# Patient Record
Sex: MALE | Race: WHITE | NOT HISPANIC OR LATINO | Employment: FULL TIME | URBAN - METROPOLITAN AREA
[De-identification: names, ages, dates, MRNs, and addresses within clinical notes are randomized per-mention and may not be internally consistent; named-entity substitution may affect disease eponyms.]

---

## 2020-02-18 ENCOUNTER — OFFICE VISIT (OUTPATIENT)
Dept: URGENT CARE | Age: 60
End: 2020-02-18
Payer: COMMERCIAL

## 2020-02-18 VITALS
TEMPERATURE: 98.2 F | SYSTOLIC BLOOD PRESSURE: 136 MMHG | RESPIRATION RATE: 20 BRPM | HEART RATE: 80 BPM | OXYGEN SATURATION: 98 % | DIASTOLIC BLOOD PRESSURE: 86 MMHG

## 2020-02-18 DIAGNOSIS — J06.9 UPPER RESPIRATORY INFECTION WITH COUGH AND CONGESTION: Primary | ICD-10-CM

## 2020-02-18 PROCEDURE — 99213 OFFICE O/P EST LOW 20 MIN: CPT | Performed by: NURSE PRACTITIONER

## 2020-02-18 RX ORDER — BENZONATATE 200 MG/1
200 CAPSULE ORAL 3 TIMES DAILY PRN
Qty: 20 CAPSULE | Refills: 0 | Status: SHIPPED | OUTPATIENT
Start: 2020-02-18 | End: 2020-08-17 | Stop reason: ALTCHOICE

## 2020-02-18 RX ORDER — PREDNISONE 20 MG/1
20 TABLET ORAL 2 TIMES DAILY WITH MEALS
Qty: 6 TABLET | Refills: 0 | Status: SHIPPED | OUTPATIENT
Start: 2020-02-18 | End: 2020-02-21

## 2020-02-18 NOTE — PROGRESS NOTES
[facility]        NAME: Keanu Watson is a 61 y o  male  : 1960    MRN: [id number]  DATE: 2020  TIME: 3:16 PM    Assessment and Plan   Upper respiratory infection with cough and congestion [J06 9]  1  Upper respiratory infection with cough and congestion  benzonatate (TESSALON) 200 MG capsule    predniSONE 20 mg tablet         Patient Instructions     Wash hands frequently, covers coughts  Take meds as directed  Likely viral infection  Follow up with PCP in 3-5 days  Proceed to  ER if symptoms worsen  Chief Complaint     Chief Complaint   Patient presents with    Cold Like Symptoms     x 3 days, c/o cough, wheezing, and chest congestion, with use of mucinex prn          History of Present Illness       HPI   C/o cough and cold with some throat discomfort x 3 days  Review of Systems   Review of Systems   Constitutional: Negative for chills and fever  HENT: Positive for congestion, rhinorrhea and sore throat  Respiratory: Positive for cough and wheezing  Negative for chest tightness and shortness of breath  Gastrointestinal: Negative for diarrhea, nausea and vomiting  Neurological: Negative for dizziness and headaches  Current Medications       Current Outpatient Medications:     benzonatate (TESSALON) 200 MG capsule, Take 1 capsule (200 mg total) by mouth 3 (three) times a day as needed for cough, Disp: 20 capsule, Rfl: 0    Naproxen Sodium (ALEVE PO), Take 1 tablet by mouth every 12 (twelve) hours as needed  , Disp: , Rfl:     predniSONE 20 mg tablet, Take 1 tablet (20 mg total) by mouth 2 (two) times a day with meals for 3 days, Disp: 6 tablet, Rfl: 0    Current Allergies     Allergies as of 2020    (No Known Allergies)            The following portions of the patient's history were reviewed and updated as appropriate: allergies, current medications, past family history, past medical history, past social history, past surgical history and problem list      Past Medical History:   Diagnosis Date    Melanoma [facility])        History reviewed  No pertinent surgical history  No family history on file  Medications have been verified  Objective   /86   Pulse 80   Temp 98 2 °F (36 8 °C) (Temporal)   Resp 20   SpO2 98%        Physical Exam     Physical Exam   Constitutional: He appears well-developed  No distress  HENT:   Right Ear: External ear normal    Left Ear: External ear normal    Cardiovascular: Normal rate and regular rhythm  Pulmonary/Chest: Effort normal and breath sounds normal    Lymphadenopathy:     He has no cervical adenopathy

## 2020-02-18 NOTE — PROGRESS NOTES
[facility]        NAME: Duane Armenta is a 61 y o  male  : 1960    MRN: [id number]  DATE: 2020  TIME: 3:18 PM    Assessment and Plan   Upper respiratory infection with cough and congestion [J06 9]  1  Upper respiratory infection with cough and congestion  benzonatate (TESSALON) 200 MG capsule    predniSONE 20 mg tablet         Patient Instructions     Take meds as directed  Wash hands frequently, covers coughs,   Adequate hydration and nutrition  Follow up with PCP in 3-5 days  Proceed to  ER if symptoms worsen  Chief Complaint     Chief Complaint   Patient presents with    Cold Like Symptoms     x 3 days, c/o cough, wheezing, and chest congestion, with use of mucinex prn          History of Present Illness       HPI       Review of Systems   Review of Systems      Current Medications       Current Outpatient Medications:     benzonatate (TESSALON) 200 MG capsule, Take 1 capsule (200 mg total) by mouth 3 (three) times a day as needed for cough, Disp: 20 capsule, Rfl: 0    Naproxen Sodium (ALEVE PO), Take 1 tablet by mouth every 12 (twelve) hours as needed  , Disp: , Rfl:     predniSONE 20 mg tablet, Take 1 tablet (20 mg total) by mouth 2 (two) times a day with meals for 3 days, Disp: 6 tablet, Rfl: 0    Current Allergies     Allergies as of 2020    (No Known Allergies)            The following portions of the patient's history were reviewed and updated as appropriate: allergies, current medications, past family history, past medical history, past social history, past surgical history and problem list      Past Medical History:   Diagnosis Date    Melanoma [facility])        History reviewed  No pertinent surgical history  No family history on file  Medications have been verified          Objective   /86   Pulse 80   Temp 98 2 °F (36 8 °C) (Temporal)   Resp 20   SpO2 98%        Physical Exam     Physical Exam

## 2020-02-18 NOTE — PATIENT INSTRUCTIONS

## 2020-02-27 ENCOUNTER — OFFICE VISIT (OUTPATIENT)
Dept: URGENT CARE | Age: 60
End: 2020-02-27
Payer: COMMERCIAL

## 2020-02-27 VITALS
DIASTOLIC BLOOD PRESSURE: 87 MMHG | OXYGEN SATURATION: 98 % | HEART RATE: 71 BPM | RESPIRATION RATE: 18 BRPM | TEMPERATURE: 98 F | SYSTOLIC BLOOD PRESSURE: 134 MMHG

## 2020-02-27 DIAGNOSIS — J01.40 ACUTE PANSINUSITIS, RECURRENCE NOT SPECIFIED: Primary | ICD-10-CM

## 2020-02-27 DIAGNOSIS — H66.92 ACUTE OTITIS MEDIA, LEFT: ICD-10-CM

## 2020-02-27 PROCEDURE — 99213 OFFICE O/P EST LOW 20 MIN: CPT | Performed by: PHYSICIAN ASSISTANT

## 2020-02-27 RX ORDER — AMOXICILLIN AND CLAVULANATE POTASSIUM 875; 125 MG/1; MG/1
1 TABLET, FILM COATED ORAL EVERY 12 HOURS SCHEDULED
Qty: 14 TABLET | Refills: 0 | Status: SHIPPED | OUTPATIENT
Start: 2020-02-27 | End: 2020-03-05

## 2020-02-27 NOTE — PROGRESS NOTES
[facility] [facility]        NAME: Narciso Ruffin is a 61 y o  male  : 1960    MRN: [id number]  DATE: 2020  TIME: 4:04 PM    Assessment and Plan   Acute pansinusitis, recurrence not specified [J01 40]  1  Acute pansinusitis, recurrence not specified  amoxicillin-clavulanate (AUGMENTIN) 875-125 mg per tablet   2  Acute otitis media, left  amoxicillin-clavulanate (AUGMENTIN) 875-125 mg per tablet         Patient Instructions     Take antibiotics as prescribed  Use Flonase or nasal saline spray for symptomatic treatment  Warm water gargles  Change toothbrush in 2-3 days  Stay hydrated with lots of water/fluids  Follow-up with PCP in the next 1-2 days for reexamination and to ensure resolution of symptoms  Go to the ED if any fevers, unable to stay hydrated, abdominal pain, chest pain, shortness of breath, change in voice, pain or difficulty swallowing, new or worsening symptoms or other concerning symptoms  Chief Complaint     Chief Complaint   Patient presents with    Cold Like Symptoms     seen  here URI, c/o cough, post nasal drip and sinus pressure/headaches, c/o possible sinus infection          History of Present Illness       60 y/o male presents with "sinusitis" x 1 week  States he was seen last week more for a cough and sore throat and told it was "viral" at the time- got tessalon perles and prednisone  Took those and said the cough got better and has now resolved  Then started with the nasal congestion, sinus pressure, post nasal drip, mild sinus headache  Not the worse headache ever had, feels like previous sinus infections  Does note some intermittent ear pain  Has not tried anything else over-the-counter  Denies any fevers  Denies any chest pain, chest tightness, wheezing, shortness of breath, GI/ symptoms other complaints  Denies any known sick contacts  No recent travel exposure to the corona virus  Denies any past medical history        Review of Systems Review of Systems   Constitutional: Negative for activity change, appetite change, chills, fatigue and fever  HENT: Positive for congestion, ear pain, postnasal drip and rhinorrhea  Negative for ear discharge, facial swelling, sinus pressure, sore throat, trouble swallowing and voice change  Eyes: Negative for itching and visual disturbance  Respiratory: Negative for cough, chest tightness, shortness of breath and wheezing  Cardiovascular: Negative for chest pain and leg swelling  Gastrointestinal: Negative for abdominal pain, diarrhea, nausea and vomiting  Musculoskeletal: Negative for back pain, joint swelling, neck pain and neck stiffness  Skin: Negative for rash  Neurological: Negative for dizziness, seizures, weakness, numbness and headaches  All other systems reviewed and are negative  Current Medications       Current Outpatient Medications:     amoxicillin-clavulanate (AUGMENTIN) 875-125 mg per tablet, Take 1 tablet by mouth every 12 (twelve) hours for 7 days, Disp: 14 tablet, Rfl: 0    benzonatate (TESSALON) 200 MG capsule, Take 1 capsule (200 mg total) by mouth 3 (three) times a day as needed for cough, Disp: 20 capsule, Rfl: 0    Naproxen Sodium (ALEVE PO), Take 1 tablet by mouth every 12 (twelve) hours as needed  , Disp: , Rfl:     Current Allergies     Allergies as of 02/27/2020    (No Known Allergies)            The following portions of the patient's history were reviewed and updated as appropriate: allergies, current medications, past family history, past medical history, past social history, past surgical history and problem list      Past Medical History:   Diagnosis Date    Melan[facility])        History reviewed  No pertinent surgical history  No family history on file  Medications have been verified          Objective   /87   Pulse 71   Temp 98 °F (36 7 °C)   Resp 18   SpO2 98%        Physical Exam     Physical Exam   Constitutional: He is oriented to person, place, and time  He appears well-developed and well-nourished  No distress  HENT:   Right Ear: Tympanic membrane normal    Left Ear: Tympanic membrane is erythematous and bulging  Mouth/Throat: Uvula is midline and mucous membranes are normal  No uvula swelling  No posterior oropharyngeal edema  Mild PND present with mildly erythematous posterior pharynx  Mild bilateral maxillary and frontal sinus pressure/discomfort to palpation  Airway patent, handling secretions  Eyes: Pupils are equal, round, and reactive to light  Neck: Normal range of motion  Neck supple  Cardiovascular: Normal rate, regular rhythm and normal heart sounds  Pulmonary/Chest: Effort normal and breath sounds normal  No respiratory distress  He has no wheezes  Neurological: He is alert and oriented to person, place, and time  Skin: Capillary refill takes less than 2 seconds  Psychiatric: He has a normal mood and affect  His behavior is normal    Nursing note and vitals reviewed

## 2020-08-17 ENCOUNTER — OFFICE VISIT (OUTPATIENT)
Dept: INTERNAL MEDICINE CLINIC | Facility: CLINIC | Age: 60
End: 2020-08-17
Payer: COMMERCIAL

## 2020-08-17 VITALS
HEART RATE: 60 BPM | DIASTOLIC BLOOD PRESSURE: 76 MMHG | OXYGEN SATURATION: 99 % | HEIGHT: 69 IN | BODY MASS INDEX: 28.58 KG/M2 | TEMPERATURE: 98.1 F | WEIGHT: 193 LBS | SYSTOLIC BLOOD PRESSURE: 132 MMHG

## 2020-08-17 DIAGNOSIS — Z12.5 SCREENING FOR PROSTATE CANCER: ICD-10-CM

## 2020-08-17 DIAGNOSIS — Z12.11 ENCOUNTER FOR SCREENING FOR MALIGNANT NEOPLASM OF COLON: ICD-10-CM

## 2020-08-17 DIAGNOSIS — Z85.820 HISTORY OF MALIGNANT MELANOMA OF SKIN: ICD-10-CM

## 2020-08-17 DIAGNOSIS — Z13.6 ENCOUNTER FOR LIPID SCREENING FOR CARDIOVASCULAR DISEASE: ICD-10-CM

## 2020-08-17 DIAGNOSIS — Z11.59 NEED FOR HEPATITIS C SCREENING TEST: ICD-10-CM

## 2020-08-17 DIAGNOSIS — Z00.00 WELL ADULT ON ROUTINE HEALTH CHECK: ICD-10-CM

## 2020-08-17 DIAGNOSIS — Z13.220 ENCOUNTER FOR LIPID SCREENING FOR CARDIOVASCULAR DISEASE: ICD-10-CM

## 2020-08-17 DIAGNOSIS — Z86.010 HISTORY OF COLON POLYPS: Primary | ICD-10-CM

## 2020-08-17 PROCEDURE — 3008F BODY MASS INDEX DOCD: CPT | Performed by: INTERNAL MEDICINE

## 2020-08-17 PROCEDURE — 1036F TOBACCO NON-USER: CPT | Performed by: INTERNAL MEDICINE

## 2020-08-17 PROCEDURE — 3725F SCREEN DEPRESSION PERFORMED: CPT | Performed by: INTERNAL MEDICINE

## 2020-08-17 PROCEDURE — 99386 PREV VISIT NEW AGE 40-64: CPT | Performed by: INTERNAL MEDICINE

## 2020-08-17 RX ORDER — MELATONIN
1000 EVERY OTHER DAY
COMMUNITY

## 2020-08-17 NOTE — PROGRESS NOTES
Assessment/Plan:    Well adult on routine health check  No acute medical problems  We will obtain some labs for routine screening  History of malignant melanoma of skin  Has annual dermatology follow-up  Encounter for lipid screening for cardiovascular disease  Will obtain lipid profile  Screening for prostate cancer  PSA and free PSA to be done  BMI 28 0-28 9,adult  Lifestyle modification       Diagnoses and all orders for this visit:    History of colon polyps  -     Ambulatory referral to Gastroenterology; Future  -     Hepatitis C antibody; Future  -     CBC and Platelet; Future  -     Comprehensive metabolic panel; Future  -     Lipid panel; Future  -     PSA, total and free; Future    Encounter for screening for malignant neoplasm of colon  -     Ambulatory referral to Gastroenterology; Future  -     Hepatitis C antibody; Future  -     CBC and Platelet; Future  -     Comprehensive metabolic panel; Future  -     Lipid panel; Future  -     PSA, total and free; Future    Need for hepatitis C screening test  -     Ambulatory referral to Gastroenterology; Future  -     Hepatitis C antibody; Future  -     CBC and Platelet; Future  -     Comprehensive metabolic panel; Future  -     Lipid panel; Future  -     PSA, total and free; Future    Well adult on routine health check    History of malignant melanoma of skin    Encounter for lipid screening for cardiovascular disease    Screening for prostate cancer    BMI 28 0-28 9,adult    Other orders  -     cholecalciferol (VITAMIN D3) 1,000 units tablet; Take 1,000 Units by mouth daily          Subjective:      Patient ID: Leeanna Krishna is a 61 y o  male  Patient presents to the office to establish primary medical care  He does not have any ongoing chronic medical issues  He has a history of malignant melanoma removed from his left chest several years ago  He sees a dermatologist annually for surveillance    He is due for follow-up surveillance colonoscopy  We will arrange for get GI consult  He offers no complaints or any change in symptoms  He has not had lab work done for quite some time so we will establish some baseline lab work        Family History   Problem Relation Age of Onset    Breast cancer Mother      Social History     Socioeconomic History    Marital status: /Civil Union     Spouse name: Not on file    Number of children: Not on file    Years of education: Not on file    Highest education level: Not on file   Occupational History    Not on file   Social Needs    Financial resource strain: Not on file    Food insecurity     Worry: Not on file     Inability: Not on file    Transportation needs     Medical: Not on file     Non-medical: Not on file   Tobacco Use    Smoking status: Never Smoker    Smokeless tobacco: Never Used   Substance and Sexual Activity    Alcohol use: Yes     Frequency: 2-3 times a week     Drinks per session: 1 or 2     Binge frequency: Never     Comment: occassionally    Drug use: No    Sexual activity: Yes   Lifestyle    Physical activity     Days per week: Not on file     Minutes per session: Not on file    Stress: Not on file   Relationships    Social connections     Talks on phone: Not on file     Gets together: Not on file     Attends Denominational service: Not on file     Active member of club or organization: Not on file     Attends meetings of clubs or organizations: Not on file     Relationship status: Not on file    Intimate partner violence     Fear of current or ex partner: Not on file     Emotionally abused: Not on file     Physically abused: Not on file     Forced sexual activity: Not on file   Other Topics Concern    Not on file   Social History Narrative    Not on file     Past Medical History:   Diagnosis Date    Melanoma [facility])        Current Outpatient Medications:     cholecalciferol (VITAMIN D3) 1,000 units tablet, Take 1,000 Units by mouth daily, Disp: , Rfl:     Naproxen Sodium (ALEVE PO), Take 1 tablet by mouth as needed , Disp: , Rfl:   No Known Allergies  Past Surgical History:   Procedure Laterality Date    SKIN SURGERY  2004    melanoma [facility]         Review of Systems   Constitutional: Negative  HENT: Negative  Eyes: Negative  Respiratory: Negative  Cardiovascular: Negative  Gastrointestinal: Negative  Genitourinary: Negative  Musculoskeletal: Negative  Skin: Negative  Allergic/Immunologic: Negative  Neurological: Negative  Hematological: Negative  Psychiatric/Behavioral: Negative  Objective:      /76 (BP Location: Left arm, Patient Position: Sitting, Cuff Size: Standard)   Pulse 60   Temp 98 1 °F (36 7 °C) (Temporal)   Ht 5' 9 45" (1 764 m)   Wt 87 5 kg (193 lb)   SpO2 99%   BMI 28 13 kg/m²  BMI Counseling: Body mass index is 28 13 kg/m²  The BMI is above normal  Nutrition recommendations include reducing portion sizes, decreasing overall calorie intake, reducing fast food intake, consuming healthier snacks, moderation in carbohydrate intake, increasing intake of lean protein, reducing intake of saturated fat and trans fat and reducing intake of cholesterol  Physical Exam  Vitals signs reviewed  Constitutional:       General: He is not in acute distress  Appearance: Normal appearance  He is not ill-appearing or diaphoretic  HENT:      Head: Normocephalic and atraumatic  Right Ear: External ear normal       Left Ear: External ear normal    Eyes:      General: No scleral icterus  Extraocular Movements: Extraocular movements intact  Conjunctiva/sclera: Conjunctivae normal       Pupils: Pupils are equal, round, and reactive to light  Neck:      Musculoskeletal: Neck supple  No neck rigidity or muscular tenderness  Vascular: No carotid bruit  Cardiovascular:      Rate and Rhythm: Normal rate and regular rhythm  Pulses: Normal pulses        Heart sounds: Normal heart sounds  No murmur  No friction rub  Pulmonary:      Effort: Pulmonary effort is normal  No respiratory distress  Breath sounds: Normal breath sounds  No wheezing or rales  Abdominal:      General: Abdomen is flat  Bowel sounds are normal  There is no distension  Palpations: Abdomen is soft  Tenderness: There is no abdominal tenderness  Musculoskeletal:         General: No swelling or deformity  Right lower leg: No edema  Left lower leg: No edema  Lymphadenopathy:      Cervical: No cervical adenopathy  Skin:     General: Skin is warm and dry  Capillary Refill: Capillary refill takes less than 2 seconds  Coloration: Skin is not jaundiced  Findings: No erythema  Neurological:      General: No focal deficit present  Mental Status: He is alert and oriented to person, place, and time  Mental status is at baseline  Cranial Nerves: No cranial nerve deficit  Gait: Gait normal    Psychiatric:         Mood and Affect: Mood normal          Behavior: Behavior normal          Thought Content:  Thought content normal          Judgment: Judgment normal

## 2020-08-18 ENCOUNTER — TELEPHONE (OUTPATIENT)
Dept: ADMINISTRATIVE | Facility: OTHER | Age: 60
End: 2020-08-18

## 2020-08-18 NOTE — TELEPHONE ENCOUNTER
Upon review of the In Basket request and the patient's chart, initial outreach has been made via fax, please see Contacts section for details  A second outreach attempt will be made within 5 business days      Thank you  Atiya Servin MA

## 2020-08-18 NOTE — TELEPHONE ENCOUNTER
----- Message from Alvarez Alonso sent at 8/17/2020  9:48 AM EDT -----  Regarding: colonoscopy  08/17/20 9:49 AM    Hello, our patient Gasper Lemus has had CRC: Colonoscopy completed/performed  Please assist in updating the patient chart by making an External outreach to [facility] facility located in [place]  The date of service is 2017      Thank you,  Alvarez Alonso  [facility]

## 2020-08-18 NOTE — LETTER
[facility] Medical Records Request for Care Gap Closure    Medical Records Fax: [phone]    Date Requested: 20  Patient: Franca Starks  Patient : 1960   Requesting on behalf of Provider: No primary care provider on file  No primary care provider on file  has requested the retrieval and updating of CRC: Colonoscopy  The office staff has provided us with the following information listed below  Prior to sending this request I have reviewed Epic Chart Review, completed an Epic Chart Search, and reviewed Care Everywhere documents  Estimated Date of Service:     Facility: [facility]   Specialty:   Performing Provider:   Additional Comments:      Your assistance in completing this request is greatly appreciated  Please send document to [phone] attention Aimee: Phone [phone]       Sincerely,      David Bishop MA

## 2020-08-31 NOTE — TELEPHONE ENCOUNTER
Upon review of your request/inquiry, we have found as a result of outreach that patient did not have the requested item(s) completed  Any additional questions or concerns should be emailed to the Practice Liaisons via [email or web address] email, please do not reply via In Basket       Thank you  Phyllis St MA

## 2020-09-23 ENCOUNTER — OFFICE VISIT (OUTPATIENT)
Dept: GASTROENTEROLOGY | Facility: CLINIC | Age: 60
End: 2020-09-23
Payer: COMMERCIAL

## 2020-09-23 VITALS
DIASTOLIC BLOOD PRESSURE: 89 MMHG | WEIGHT: 193 LBS | TEMPERATURE: 97.4 F | HEART RATE: 59 BPM | BODY MASS INDEX: 27.63 KG/M2 | SYSTOLIC BLOOD PRESSURE: 149 MMHG | HEIGHT: 70 IN

## 2020-09-23 DIAGNOSIS — Z08 ENCOUNTER FOR FOLLOW-UP SURVEILLANCE OF COLON CANCER: ICD-10-CM

## 2020-09-23 DIAGNOSIS — Z86.010 HISTORY OF COLON POLYPS: Primary | ICD-10-CM

## 2020-09-23 DIAGNOSIS — Z12.11 ENCOUNTER FOR SCREENING FOR MALIGNANT NEOPLASM OF COLON: ICD-10-CM

## 2020-09-23 DIAGNOSIS — Z11.59 NEED FOR HEPATITIS C SCREENING TEST: ICD-10-CM

## 2020-09-23 DIAGNOSIS — Z85.038 ENCOUNTER FOR FOLLOW-UP SURVEILLANCE OF COLON CANCER: ICD-10-CM

## 2020-09-23 PROCEDURE — 99243 OFF/OP CNSLTJ NEW/EST LOW 30: CPT | Performed by: INTERNAL MEDICINE

## 2020-09-23 PROCEDURE — 1036F TOBACCO NON-USER: CPT | Performed by: INTERNAL MEDICINE

## 2020-09-23 RX ORDER — SODIUM, POTASSIUM,MAG SULFATES 17.5-3.13G
SOLUTION, RECONSTITUTED, ORAL ORAL
Qty: 1 BOTTLE | Refills: 0 | Status: SHIPPED | OUTPATIENT
Start: 2020-09-23 | End: 2020-11-19 | Stop reason: ALTCHOICE

## 2020-09-23 NOTE — PATIENT INSTRUCTIONS
Colonoscopy scheduled for 11/19/20 with Dr Orr [place] [facility]  Supr instructions given by Ly REYES in the office

## 2020-09-23 NOTE — PROGRESS NOTES
[facility] - Outpatient Note  Leeanna Krishna 61 y o  male MRN: [id number]  Encounter: [id number]      ASSESSMENT AND PLAN:    Leeanna Krishna is a 61 y o  old male with PMH of colonic polyps who presents for consultation for colonoscopy referral    History of colonic polyps - patient reports colonoscopy approximately 7-8 years ago and was told to repeat in 5 years, presumably due to pre cancerous lesion  No family history of colon cancer  No symptoms  · Colonoscopy ordered    Hepatitis-C screening   · Hepatitis-C antibody pending per PCP    1  History of colon polyps    - Ambulatory referral to Gastroenterology  - Na Sulfate-K Sulfate-Mg Sulf (Suprep Bowel Prep Kit) 17 5-3 13-1 6 GM/177ML SOLN; Use as instructed in clinic  Dispense: 1 Bottle; Refill: 0    2  Encounter for screening for malignant neoplasm of colon    - Ambulatory referral to Gastroenterology    3  Need for hepatitis C screening test    - Ambulatory referral to Gastroenterology    4  Encounter for follow-up surveillance of colon cancer    - Colonoscopy; Future  - Na Sulfate-K Sulfate-Mg Sulf (Suprep Bowel Prep Kit) 17 5-3 13-1 6 GM/177ML SOLN; Use as instructed in clinic  Dispense: 1 Bottle; Refill: 0    ______________________________________________________________________    SUBJECTIVE:  Leeanna Krishna is a 61 y o  old male with PMH of colonic polyps who presents for consultation for colonoscopy referral  Patient denies abdominal pain, nausea, vomiting, constipation, diarrhea, fevers/chills  Last colonoscopy about 7 8 years ago with 1 polyp with repeat due in 5 years likely due to pre cancerous lesion  No previous EGD  Not on NSAIDs or blood thinners  No family history of colon cancer  No previous abdominal surgeries  Does not smoke or drink alcohol  No labs  REVIEW OF SYSTEMS IS OTHERWISE NEGATIVE        Historical Information   Past Medical History:   Diagnosis Date    Melanoma [facility])      Past Surgical History:   Procedure Laterality Date    SKIN SURGERY  2004    melanoma removal [facility]     Social History   Social History     Substance and Sexual Activity   Alcohol Use Yes    Frequency: 2-3 times a week    Drinks per session: 1 or 2    Binge frequency: Never    Comment: occassionally     Social History     Substance and Sexual Activity   Drug Use No     Social History     Tobacco Use   Smoking Status Never Smoker   Smokeless Tobacco Never Used     Family History   Problem Relation Age of Onset    Breast cancer Mother     Cirrhosis Father        Meds/Allergies       Current Outpatient Medications:     cholecalciferol (VITAMIN D3) 1,000 units tablet    Naproxen Sodium (ALEVE PO)    Na Sulfate-K Sulfate-Mg Sulf (Suprep Bowel Prep Kit) 17 5-3 13-1 6 GM/177ML SOLN    No Known Allergies        Objective     Blood pressure 149/89, pulse 59, temperature (!) 97 4 °F (36 3 °C), temperature source Tympanic, height 5' 10" (1 778 m), weight 87 5 kg (193 lb)  Body mass index is 27 69 kg/m²  PHYSICAL EXAM:      General Appearance:   Alert, cooperative, no distress   HEENT:   Normocephalic, atraumatic, anicteric  Neck:  Supple, symmetrical, trachea midline   Lungs:   Clear to auscultation bilaterally; no rales, rhonchi or wheezing; respirations unlabored    Heart[de-identified]   Regular rate and rhythm; no murmur, rub, or gallop  Abdomen:   Soft, non-tender, non-distended; normal bowel sounds; no masses, no organomegaly    Genitalia:   Deferred    Rectal:   Deferred    Extremities:  No cyanosis, clubbing or edema    Pulses:  2+ and symmetric    Skin:  No jaundice, rashes, or lesions    Lymph nodes:  No palpable cervical lymphadenopathy        Lab Results:   No visits with results within 1 Day(s) from this visit  Latest known visit with results is:   No results found for any previous visit  Radiology Results:   No results found

## 2020-11-11 ENCOUNTER — PREP FOR PROCEDURE (OUTPATIENT)
Dept: GASTROENTEROLOGY | Facility: CLINIC | Age: 60
End: 2020-11-11

## 2020-11-11 DIAGNOSIS — Z20.822 ENCOUNTER FOR LABORATORY TESTING FOR COVID-19 VIRUS: Primary | ICD-10-CM

## 2020-11-19 ENCOUNTER — ANESTHESIA EVENT (OUTPATIENT)
Dept: GASTROENTEROLOGY | Facility: HOSPITAL | Age: 60
End: 2020-11-19

## 2020-11-19 ENCOUNTER — ANESTHESIA (OUTPATIENT)
Dept: GASTROENTEROLOGY | Facility: HOSPITAL | Age: 60
End: 2020-11-19

## 2020-11-19 ENCOUNTER — HOSPITAL ENCOUNTER (OUTPATIENT)
Dept: GASTROENTEROLOGY | Facility: HOSPITAL | Age: 60
Setting detail: OUTPATIENT SURGERY
Discharge: HOME | End: 2020-11-19
Attending: INTERNAL MEDICINE | Admitting: INTERNAL MEDICINE
Payer: COMMERCIAL

## 2020-11-19 VITALS
TEMPERATURE: 95.7 F | SYSTOLIC BLOOD PRESSURE: 122 MMHG | HEART RATE: 76 BPM | BODY MASS INDEX: 27.63 KG/M2 | DIASTOLIC BLOOD PRESSURE: 72 MMHG | HEIGHT: 70 IN | RESPIRATION RATE: 18 BRPM | WEIGHT: 193 LBS | OXYGEN SATURATION: 98 %

## 2020-11-19 VITALS — HEART RATE: 85 BPM

## 2020-11-19 DIAGNOSIS — Z08 ENCOUNTER FOR FOLLOW-UP SURVEILLANCE OF COLON CANCER: ICD-10-CM

## 2020-11-19 DIAGNOSIS — Z85.038 ENCOUNTER FOR FOLLOW-UP SURVEILLANCE OF COLON CANCER: ICD-10-CM

## 2020-11-19 PROCEDURE — 45380 COLONOSCOPY AND BIOPSY: CPT | Performed by: INTERNAL MEDICINE

## 2020-11-19 PROCEDURE — 88305 TISSUE EXAM BY PATHOLOGIST: CPT | Performed by: PATHOLOGY

## 2020-11-19 RX ORDER — PROPOFOL 10 MG/ML
INJECTION, EMULSION INTRAVENOUS CONTINUOUS PRN
Status: DISCONTINUED | OUTPATIENT
Start: 2020-11-19 | End: 2020-11-19

## 2020-11-19 RX ORDER — SODIUM CHLORIDE 9 MG/ML
INJECTION, SOLUTION INTRAVENOUS CONTINUOUS PRN
Status: DISCONTINUED | OUTPATIENT
Start: 2020-11-19 | End: 2020-11-19

## 2020-11-19 RX ORDER — LIDOCAINE HYDROCHLORIDE 10 MG/ML
INJECTION, SOLUTION EPIDURAL; INFILTRATION; INTRACAUDAL; PERINEURAL AS NEEDED
Status: DISCONTINUED | OUTPATIENT
Start: 2020-11-19 | End: 2020-11-19

## 2020-11-19 RX ORDER — PROPOFOL 10 MG/ML
INJECTION, EMULSION INTRAVENOUS AS NEEDED
Status: DISCONTINUED | OUTPATIENT
Start: 2020-11-19 | End: 2020-11-19

## 2020-11-19 RX ADMIN — Medication 1 MG: at 14:36

## 2020-11-19 RX ADMIN — PROPOFOL 130 MCG/KG/MIN: 10 INJECTION, EMULSION INTRAVENOUS at 14:30

## 2020-11-19 RX ADMIN — SODIUM CHLORIDE: 0.9 INJECTION, SOLUTION INTRAVENOUS at 14:09

## 2020-11-19 RX ADMIN — PROPOFOL 100 MG: 10 INJECTION, EMULSION INTRAVENOUS at 14:30

## 2020-11-19 RX ADMIN — LIDOCAINE HYDROCHLORIDE 50 MG: 10 INJECTION, SOLUTION EPIDURAL; INFILTRATION; INTRACAUDAL; PERINEURAL at 14:30

## 2020-12-02 ENCOUNTER — OFFICE VISIT (OUTPATIENT)
Dept: URGENT CARE | Age: 60
End: 2020-12-02
Payer: COMMERCIAL

## 2020-12-02 VITALS — RESPIRATION RATE: 18 BRPM | TEMPERATURE: 97.7 F | HEART RATE: 65 BPM | OXYGEN SATURATION: 98 %

## 2020-12-02 DIAGNOSIS — Z20.822 EXPOSURE TO COVID-19 VIRUS: Primary | ICD-10-CM

## 2020-12-02 PROCEDURE — U0003 INFECTIOUS AGENT DETECTION BY NUCLEIC ACID (DNA OR RNA); SEVERE ACUTE RESPIRATORY SYNDROME CORONAVIRUS 2 (SARS-COV-2) (CORONAVIRUS DISEASE [COVID-19]), AMPLIFIED PROBE TECHNIQUE, MAKING USE OF HIGH THROUGHPUT TECHNOLOGIES AS DESCRIBED BY CMS-2020-01-R: HCPCS | Performed by: NURSE PRACTITIONER

## 2020-12-02 PROCEDURE — 99213 OFFICE O/P EST LOW 20 MIN: CPT | Performed by: NURSE PRACTITIONER

## 2020-12-05 LAB — SARS-COV-2 RNA SPEC QL NAA+PROBE: NOT DETECTED

## 2020-12-18 ENCOUNTER — TELEPHONE (OUTPATIENT)
Dept: INTERNAL MEDICINE CLINIC | Facility: CLINIC | Age: 60
End: 2020-12-18

## 2021-02-08 ENCOUNTER — TELEPHONE (OUTPATIENT)
Dept: INTERNAL MEDICINE CLINIC | Facility: CLINIC | Age: 61
End: 2021-02-08

## 2021-02-08 NOTE — TELEPHONE ENCOUNTER
Received a medical records request from [facility] on 2/8/21 and the request has been faxed to [facility]

## 2021-03-30 DIAGNOSIS — Z23 ENCOUNTER FOR IMMUNIZATION: ICD-10-CM

## 2021-08-13 ENCOUNTER — OFFICE VISIT (OUTPATIENT)
Dept: INTERNAL MEDICINE CLINIC | Facility: CLINIC | Age: 61
End: 2021-08-13
Payer: COMMERCIAL

## 2021-08-13 VITALS
HEART RATE: 54 BPM | BODY MASS INDEX: 28.85 KG/M2 | DIASTOLIC BLOOD PRESSURE: 84 MMHG | WEIGHT: 194.8 LBS | TEMPERATURE: 97.5 F | SYSTOLIC BLOOD PRESSURE: 140 MMHG | OXYGEN SATURATION: 99 % | HEIGHT: 69 IN

## 2021-08-13 DIAGNOSIS — Z13.220 ENCOUNTER FOR LIPID SCREENING FOR CARDIOVASCULAR DISEASE: ICD-10-CM

## 2021-08-13 DIAGNOSIS — Z00.00 WELL ADULT ON ROUTINE HEALTH CHECK: Primary | ICD-10-CM

## 2021-08-13 DIAGNOSIS — Z13.6 ENCOUNTER FOR LIPID SCREENING FOR CARDIOVASCULAR DISEASE: ICD-10-CM

## 2021-08-13 PROCEDURE — 99396 PREV VISIT EST AGE 40-64: CPT | Performed by: INTERNAL MEDICINE

## 2021-08-13 PROCEDURE — 3725F SCREEN DEPRESSION PERFORMED: CPT | Performed by: INTERNAL MEDICINE

## 2021-08-13 PROCEDURE — 1036F TOBACCO NON-USER: CPT | Performed by: INTERNAL MEDICINE

## 2021-08-13 PROCEDURE — 3008F BODY MASS INDEX DOCD: CPT | Performed by: INTERNAL MEDICINE

## 2021-08-13 NOTE — ASSESSMENT & PLAN NOTE
Encouraged dietary prudence, limiting salt intake,  Lean animal protein, healthy snacks, portion control and an abundance of fruit and vegetables

## 2021-08-13 NOTE — PROGRESS NOTES
Assessment/Plan:    Encounter for lipid screening for cardiovascular disease   Lipid profile is again requested    Well adult on routine health check   Routine labs have been ordered along with lipid and PSA studies    BMI 28 0-28 9,adult   Encouraged dietary prudence, limiting salt intake,  Lean animal protein, healthy snacks, portion control and an abundance of fruit and vegetables  Diagnoses and all orders for this visit:    Well adult on routine health check  -     CBC and Platelet; Future  -     Comprehensive metabolic panel; Future  -     Lipid panel; Future  -     Hepatitis C antibody; Future    BMI 28 0-28 9,adult  -     CBC and Platelet; Future  -     Comprehensive metabolic panel; Future  -     Lipid panel; Future  -     Hepatitis C antibody; Future    Encounter for lipid screening for cardiovascular disease  -     CBC and Platelet; Future  -     Comprehensive metabolic panel; Future  -     Lipid panel; Future  -     Hepatitis C antibody; Future          Subjective:      Patient ID: Yefri Braxton is a 64 y o  male  Patient presents to the office for a routine health check  He offers no complaints  He has not had any of the lab work  done that was requested after last year's physical  He sees Dermatology annually because of his history of malignant melanoma  Both he and his wife had colonoscopies last November  He was found to have some polyps and a recall colonoscopy schedule for 5 years  He reports no significant issues at this time  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  He is likely looking at [employer] [facility] when he turns 58        Family History   Problem Relation Age of Onset    Breast cancer Mother     Cirrhosis Father      Social History     Socioeconomic History    Marital status: /Civil Union     Spouse name: Not on file    Number of children: Not on file    Years of education: Not on file    Highest education level: Not on file   Occupational History    Not on file   Tobacco Use    Smoking status: Never Smoker    Smokeless tobacco: Never Used   Vaping Use    Vaping Use: Never used   Substance and Sexual Activity    Alcohol use: Yes     Comment: beer on weekends    Drug use: No    Sexual activity: Yes   Other Topics Concern    Not on file   Social History Narrative    Not on file     Social Determinants of Health     Financial Resource Strain:     Difficulty of Paying Living Expenses:    Food Insecurity:     Worried About Running Out of Food in the Last Year:     Ran Out of Food in the Last Year:    Transportation Needs:     Lack of Transportation (Medical):  Lack of Transportation (Non-Medical):    Physical Activity:     Days of Exercise per Week:     Minutes of Exercise per Session:    Stress:     Feeling of Stress :    Social Connections:     Frequency of Communication with Friends and Family:     Frequency of Social Gatherings with Friends and Family:     Attends [employer] Services:     Active Member of Clubs or Organizations:     Attends Club or Organization Meetings:     Marital Status:    Intimate Partner Violence:     Fear of Current or Ex-Partner:     Emotionally Abused:     Physically Abused:     Sexually Abused:      Past Medical History:   Diagnosis Date    Colon polyps 11/2020    Melanoma ([facility] [address] )        Current Outpatient Medications:     cholecalciferol (VITAMIN D3) 1,000 units tablet, Take 1,000 Units by mouth every other day , Disp: , Rfl:     Naproxen Sodium (ALEVE PO), Take 1 tablet by mouth as needed  (Patient not taking: Reported on 8/13/2021), Disp: , Rfl:   No Known Allergies  Past Surgical History:   Procedure Laterality Date    SKIN SURGERY  2004    melanoma [facility]         Review of Systems   Constitutional: Negative  HENT: Negative  Eyes: Negative  Respiratory: Negative  Cardiovascular: Negative  Gastrointestinal: Negative  Endocrine: Negative  Genitourinary: Negative  Musculoskeletal: Negative  Skin: Negative  Allergic/Immunologic: Negative  Neurological: Negative  Hematological: Negative  Psychiatric/Behavioral: Negative  Objective:      /84   Pulse (!) 54   Temp 97 5 °F (36 4 °C) (Tympanic)   Ht 5' 9 49" (1 765 m)   Wt 88 4 kg (194 lb 12 8 oz)   SpO2 99%   BMI 28 36 kg/m²  BMI Counseling: Body mass index is 28 36 kg/m²  The BMI is above normal  Nutrition recommendations include reducing portion sizes, decreasing overall calorie intake, 3-5 servings of fruits/vegetables daily, consuming healthier snacks, moderation in carbohydrate intake, increasing intake of lean protein, reducing intake of saturated fat and trans fat and reducing intake of cholesterol  Physical Exam  Vitals reviewed  Constitutional:       General: He is not in acute distress  Appearance: Normal appearance  He is not ill-appearing, toxic-appearing or diaphoretic  HENT:      Head: Normocephalic and atraumatic  Right Ear: Tympanic membrane normal       Left Ear: Tympanic membrane normal       Nose: Nose normal    Eyes:      General: No scleral icterus  Conjunctiva/sclera: Conjunctivae normal       Pupils: Pupils are equal, round, and reactive to light  Neck:      Vascular: No carotid bruit  Cardiovascular:      Rate and Rhythm: Normal rate and regular rhythm  Pulses: Normal pulses  Heart sounds: Normal heart sounds  No murmur heard  Pulmonary:      Effort: Pulmonary effort is normal  No respiratory distress  Breath sounds: Normal breath sounds  Abdominal:      General: Abdomen is flat  There is no distension  Musculoskeletal:         General: No swelling  Cervical back: Neck supple  Right lower leg: No edema  Left lower leg: No edema  Skin:     General: Skin is warm  Coloration: Skin is not jaundiced  Findings: No bruising or erythema     Neurological:      General: No focal deficit present  Mental Status: He is alert and oriented to person, place, and time  Mental status is at baseline     Psychiatric:         Mood and Affect: Mood normal          Behavior: Behavior normal

## 2022-06-29 ENCOUNTER — OFFICE VISIT (OUTPATIENT)
Dept: INTERNAL MEDICINE CLINIC | Facility: CLINIC | Age: 62
End: 2022-06-29
Payer: COMMERCIAL

## 2022-06-29 VITALS
DIASTOLIC BLOOD PRESSURE: 88 MMHG | SYSTOLIC BLOOD PRESSURE: 136 MMHG | BODY MASS INDEX: 28.2 KG/M2 | WEIGHT: 197 LBS | OXYGEN SATURATION: 97 % | HEART RATE: 88 BPM | HEIGHT: 70 IN | TEMPERATURE: 97 F

## 2022-06-29 DIAGNOSIS — M54.50 ACUTE MIDLINE LOW BACK PAIN WITHOUT SCIATICA: Primary | ICD-10-CM

## 2022-06-29 PROCEDURE — 3725F SCREEN DEPRESSION PERFORMED: CPT | Performed by: NURSE PRACTITIONER

## 2022-06-29 PROCEDURE — 99213 OFFICE O/P EST LOW 20 MIN: CPT | Performed by: NURSE PRACTITIONER

## 2022-06-29 PROCEDURE — 3008F BODY MASS INDEX DOCD: CPT | Performed by: NURSE PRACTITIONER

## 2022-06-29 PROCEDURE — 1036F TOBACCO NON-USER: CPT | Performed by: NURSE PRACTITIONER

## 2022-06-29 RX ORDER — MELOXICAM 15 MG/1
15 TABLET ORAL DAILY
Qty: 30 TABLET | Refills: 0 | Status: SHIPPED | OUTPATIENT
Start: 2022-06-29

## 2022-06-29 NOTE — PROGRESS NOTES
Assessment/Plan:    Problem List Items Addressed This Visit        Other    Acute midline low back pain without sciatica - Primary     - check XR lumbar spine d/t bony tenderness  - start mobic once daily with food  - if XR is okay, will have pt start PT  - follow up in 4-6 weeks            Relevant Medications    meloxicam (Mobic) 15 mg tablet    Other Relevant Orders    XR spine lumbar 2 or 3 views injury    Ambulatory Referral to Physical Therapy               M*Modal software was used to dictate this note  It may contain errors with dictating incorrect words or incorrect spelling  Please contact the provider directly with any questions  Subjective:      Patient ID: Chloe Alvarado is a 58 y o  male  HPI     Patient presents today with concern for low back pain  Onset was about 2 weeks ago after he was hanging a door and lifted it and thinks it was too heavy  The next morning is when he started with midline lower back pain  Pain is made worse first thing in the morning and with any lifting  He started aleve 2 days ago with some improvement  He denies any muscle spasms  Pain does not radiate into his legs  He denies any leg numbness, tinlging or weakness  He does have any a hx of a low back injury about 5 years ago that he completed physical therapy for  The following portions of the patient's history were reviewed and updated as appropriate: allergies, current medications, past family history, past medical history, past social history, past surgical history and problem list     Review of Systems   Musculoskeletal: Positive for back pain  Negative for gait problem  Neurological: Negative for weakness and numbness           Past Medical History:   Diagnosis Date    Colon polyps 11/2020    Melanoma [facility])          Current Outpatient Medications:     cholecalciferol (VITAMIN D3) 1,000 units tablet, Take 1,000 Units by mouth every other day , Disp: , Rfl:     meloxicam (Mobic) 15 mg tablet, Take 1 tablet (15 mg total) by mouth daily, Disp: 30 tablet, Rfl: 0    No Known Allergies    Social History   Past Surgical History:   Procedure Laterality Date    SKIN SURGERY  2004    melanoma removal - [address]     Family History   Problem Relation Age of Onset    Breast cancer Mother     Cirrhosis Father        Objective:  /88 (BP Location: Left arm, Patient Position: Sitting, Cuff Size: Standard)   Pulse 88   Temp (!) 97 °F (36 1 °C) (Tympanic)   Ht 5' 10" (1 778 m) Comment: with boots  Wt 89 4 kg (197 lb) Comment: with boots  SpO2 97%   BMI 28 27 kg/m²      Physical Exam  Vitals reviewed  Constitutional:       General: He is not in acute distress  Appearance: Normal appearance  He is not diaphoretic  HENT:      Head: Normocephalic and atraumatic  Eyes:      Extraocular Movements: Extraocular movements intact  Conjunctiva/sclera: Conjunctivae normal       Pupils: Pupils are equal, round, and reactive to light  Cardiovascular:      Rate and Rhythm: Normal rate and regular rhythm  Heart sounds: Normal heart sounds  No murmur heard  Pulmonary:      Effort: Pulmonary effort is normal  No respiratory distress  Breath sounds: Normal breath sounds  No wheezing, rhonchi or rales  Musculoskeletal:      Thoracic back: Bony tenderness present  Decreased range of motion  Neurological:      Mental Status: He is alert and oriented to person, place, and time  Mental status is at baseline        Deep Tendon Reflexes: Reflexes normal    Psychiatric:         Mood and Affect: Mood normal          Behavior: Behavior normal

## 2022-06-29 NOTE — ASSESSMENT & PLAN NOTE
- check XR lumbar spine d/t bony tenderness  - start mobic once daily with food  - if XR is okay, will have pt start PT  - follow up in 4-6 weeks

## 2023-03-25 ENCOUNTER — OFFICE VISIT (OUTPATIENT)
Dept: URGENT CARE | Age: 63
End: 2023-03-25

## 2023-03-25 VITALS
RESPIRATION RATE: 18 BRPM | OXYGEN SATURATION: 98 % | SYSTOLIC BLOOD PRESSURE: 144 MMHG | TEMPERATURE: 97.1 F | DIASTOLIC BLOOD PRESSURE: 92 MMHG | HEART RATE: 66 BPM

## 2023-03-25 DIAGNOSIS — J30.9 ALLERGIC RHINITIS, UNSPECIFIED SEASONALITY, UNSPECIFIED TRIGGER: Primary | ICD-10-CM

## 2023-03-25 NOTE — PROGRESS NOTES
[address] [facility]        NAME: Mesha Huizar is a 61 y o  male  : 1960    MRN: [id number]  DATE: 2023  TIME: 11:38 AM    Assessment and Plan   Allergic rhinitis, unspecified seasonality, unspecified trigger [J30 9]  1  Allergic rhinitis, unspecified seasonality, unspecified trigger              Patient Instructions     Mucinex DM OTC prn  Claritin 10 mg OTC daily  Follow up with PCP in 3-5 days  Proceed to  ER if symptoms worsen  Chief Complaint     Chief Complaint   Patient presents with   • Sinusitis     Patient has a history of sinus infection in the past he started to developed nasal congestion, pressure on Wednesday and not getting any better         History of Present Illness       HPI     Patient reports nasal congestion that has been going on since Wednesday  He denies any fevers, chills or body aches  He tried taking regular mucinex OTC with no relief in symptoms  Review of Systems   Review of Systems   Constitutional: Negative for chills and fever  HENT: Positive for congestion and rhinorrhea  Eyes:        Watery   Respiratory: Positive for cough  Negative for shortness of breath  Cardiovascular: Negative for chest pain  Gastrointestinal: Negative for abdominal pain, diarrhea and vomiting           Current Medications       Current Outpatient Medications:   •  cholecalciferol (VITAMIN D3) 1,000 units tablet, Take 1,000 Units by mouth every other day , Disp: , Rfl:   •  meloxicam (Mobic) 15 mg tablet, Take 1 tablet (15 mg total) by mouth daily, Disp: 30 tablet, Rfl: 0    Current Allergies     Allergies as of 2023   • (No Known Allergies)            The following portions of the patient's history were reviewed and updated as appropriate: allergies, current medications, past family history, past medical history, past social history, past surgical history and problem list      Past Medical History:   Diagnosis Date   • Colon polyps 2020   • Melanoma ([facility] [address] ) Past Surgical History:   Procedure Laterality Date   • SKIN SURGERY  2004    melanoma removal - [address]       Family History   Problem Relation Age of Onset   • Breast cancer Mother    • Cirrhosis Father          Medications have been verified  Objective   /92 (BP Location: Right arm, Patient Position: Sitting, Cuff Size: Standard)   Pulse 66   Temp (!) 97 1 °F (36 2 °C)   Resp 18   SpO2 98%   No LMP for male patient  Physical Exam     Physical Exam  HENT:      Right Ear: Tympanic membrane is erythematous  Nose: Congestion and rhinorrhea present  Mouth/Throat:      Mouth: Mucous membranes are moist       Pharynx: No posterior oropharyngeal erythema  Cardiovascular:      Rate and Rhythm: Normal rate and regular rhythm  Pulmonary:      Effort: Pulmonary effort is normal       Breath sounds: Normal breath sounds  Neurological:      Mental Status: He is alert

## 2023-05-02 ENCOUNTER — RA CDI HCC (OUTPATIENT)
Dept: OTHER | Facility: HOSPITAL | Age: 63
End: 2023-05-02

## 2023-05-02 NOTE — PROGRESS NOTES
[facility]  coding opportunities       Chart reviewed, no opportunity found: CHART REVIEWED, NO OPPORTUNITY FOUND        Patients Insurance        Commercial Insurance: [employer]

## 2023-05-05 ENCOUNTER — OFFICE VISIT (OUTPATIENT)
Dept: INTERNAL MEDICINE CLINIC | Facility: CLINIC | Age: 63
End: 2023-05-05

## 2023-05-05 VITALS
WEIGHT: 194 LBS | DIASTOLIC BLOOD PRESSURE: 82 MMHG | TEMPERATURE: 97.9 F | HEIGHT: 70 IN | BODY MASS INDEX: 27.77 KG/M2 | OXYGEN SATURATION: 98 % | HEART RATE: 67 BPM | SYSTOLIC BLOOD PRESSURE: 120 MMHG

## 2023-05-05 DIAGNOSIS — Z12.5 SCREENING FOR PROSTATE CANCER: ICD-10-CM

## 2023-05-05 DIAGNOSIS — Z13.220 ENCOUNTER FOR LIPID SCREENING FOR CARDIOVASCULAR DISEASE: ICD-10-CM

## 2023-05-05 DIAGNOSIS — Z00.00 ANNUAL PHYSICAL EXAM: ICD-10-CM

## 2023-05-05 DIAGNOSIS — Z13.6 ENCOUNTER FOR LIPID SCREENING FOR CARDIOVASCULAR DISEASE: ICD-10-CM

## 2023-05-05 DIAGNOSIS — Z00.00 WELL ADULT ON ROUTINE HEALTH CHECK: Primary | ICD-10-CM

## 2023-05-05 NOTE — PROGRESS NOTES
ADULT ANNUAL [address] [facility] PRIMARY CARE [place]    NAME: Joselito Gonsalez  AGE: 61 y o  SEX: male  : 1960     DATE: 2023     Assessment and Plan:     Problem List Items Addressed This Visit    None      Immunizations and preventive care screenings were discussed with patient today  Appropriate education was printed on patient's after visit summary  Discussed risks and benefits of prostate cancer screening  We discussed the controversial history of PSA screening for prostate cancer in the [place] as well as the risk of over detection and over treatment of prostate cancer by way of PSA screening  The patient understands that PSA blood testing is an imperfect way to screen for prostate cancer and that elevated PSA levels in the blood may also be caused by infection, inflammation, prostatic trauma or manipulation, urological procedures, or by benign prostatic enlargement  The role of the digital rectal examination in prostate cancer screening was also discussed and I discussed with him that there is large interobserver variability in the findings of digital rectal examination  Counseling:  · Alcohol/drug use: discussed moderation in alcohol intake, the recommendations for healthy alcohol use, and avoidance of illicit drug use  No follow-ups on file  Chief Complaint:     Chief Complaint   Patient presents with    Physical Exam     No recent labs  History of Present Illness:     Adult Annual Physical   Patient here for a comprehensive physical exam  The patient reports no problems  Diet and Physical Activity  · Diet/Nutrition: well balanced diet     · Exercise: less than 30 minutes on average and does daily stretches in AM/PM       Depression Screening  PHQ-2/9 Depression Screening    Little interest or pleasure in doing things: 0 - not at all  Feeling down, depressed, or hopeless: 0 - not at all  PHQ-2 Score: 0  PHQ-2 Interpretation: Negative depression screen       General Health  · Sleep: gets 7-8 hours of sleep on average  · Hearing: normal - bilateral   · Vision: no vision problems  · Dental: regular dental visits   Health  · Symptoms include: none     Review of Systems:     Review of Systems   Constitutional: Negative  Negative for unexpected weight change  HENT: Negative  Eyes: Negative  Respiratory: Negative  Cardiovascular: Negative  Gastrointestinal: Negative  Endocrine: Negative  Genitourinary: Negative  Musculoskeletal: Positive for arthralgias (rgiht wrist stiffness)  Allergic/Immunologic: Positive for environmental allergies  Neurological: Negative  Hematological: Negative  Psychiatric/Behavioral: Negative         Past Medical History:     Past Medical History:   Diagnosis Date    Colon polyps 11/2020    Melanoma [facility])       Past Surgical History:     Past Surgical History:   Procedure Laterality Date    SKIN SURGERY  2004    [address]anoma [address] - [address]      Family History:     Family History   Problem Relation Age of Onset    Breast cancer Mother     Cirrhosis Father       Social History:     Social History     Socioeconomic History    Marital status: /Civil Union     Spouse name: None    Number of children: None    Years of education: None    Highest education level: None   Occupational History    None   Tobacco Use    Smoking status: Never    Smokeless tobacco: Never   Vaping Use    Vaping Use: Never used   Substance and Sexual Activity    Alcohol use: Yes     Comment: 1-2 beers on occasion    Drug use: No    Sexual activity: Yes   Other Topics Concern    None   Social History Narrative    None     Social Determinants of Health     Financial Resource Strain: Not on file   Food Insecurity: Not on file   Transportation Needs: Not on file   Physical Activity: Not on file   Stress: Not on file   Social Connections: Not on "file   Intimate Partner Violence: Not on file   Housing Stability: Not on file      Current Medications:     Current Outpatient Medications   Medication Sig Dispense Refill    cholecalciferol (VITAMIN D3) 1,000 units tablet Take 1,000 Units by mouth every other day       Fexofenadine HCl (ALLEGRA PO) Take by mouth every other day      meloxicam (Mobic) 15 mg tablet Take 1 tablet (15 mg total) by mouth daily 30 tablet 0     No current facility-administered medications for this visit  Allergies:     No Known Allergies   Physical Exam:     /82 (BP Location: Left arm, Patient Position: Sitting, Cuff Size: Large)   Pulse 67   Temp 97 9 °F (36 6 °C) (Tympanic)   Ht 5' 10\" (1 778 m)   Wt 88 kg (194 lb)   SpO2 98%   BMI 27 84 kg/m²     Physical Exam  Vitals reviewed  Constitutional:       General: He is not in acute distress  Appearance: Normal appearance  He is well-developed and normal weight  He is not ill-appearing, toxic-appearing or diaphoretic  HENT:      Head: Normocephalic and atraumatic  Right Ear: Tympanic membrane, ear canal and external ear normal  There is no impacted cerumen  Left Ear: Tympanic membrane, ear canal and external ear normal  There is no impacted cerumen  Nose: Nose normal       Mouth/Throat:      Mouth: Mucous membranes are moist       Pharynx: Oropharynx is clear  Eyes:      General: No scleral icterus  Conjunctiva/sclera: Conjunctivae normal       Pupils: Pupils are equal, round, and reactive to light  Neck:      Vascular: No carotid bruit  Cardiovascular:      Rate and Rhythm: Normal rate and regular rhythm  Pulses: Normal pulses  Heart sounds: Normal heart sounds  No murmur heard  Pulmonary:      Effort: Pulmonary effort is normal  No respiratory distress  Breath sounds: Normal breath sounds  Abdominal:      General: Abdomen is flat  Bowel sounds are normal  There is no distension  Palpations: Abdomen is soft   There " is no mass  Tenderness: There is no abdominal tenderness  There is no right CVA tenderness, left CVA tenderness, guarding or rebound  Hernia: No hernia is present  Musculoskeletal:         General: No swelling, tenderness or deformity  Normal range of motion  Cervical back: Normal range of motion and neck supple  Right lower leg: No edema  Left lower leg: No edema  Skin:     General: Skin is warm and dry  Capillary Refill: Capillary refill takes less than 2 seconds  Coloration: Skin is not jaundiced  Findings: No bruising, erythema or rash  Neurological:      General: No focal deficit present  Mental Status: He is alert and oriented to person, place, and time  Mental status is at baseline  Psychiatric:         Mood and Affect: Mood normal          Behavior: Behavior normal          Thought Content:  Thought content normal          Judgment: Judgment normal           Marielena Johnston, MD [facility] [place]

## 2023-05-05 NOTE — ASSESSMENT & PLAN NOTE
Lipid profile requested
No medical issues    We will obtain a lipid profile routine lab screening PSA
PSA ordered
no

## 2024-02-21 PROBLEM — Z12.5 SCREENING FOR PROSTATE CANCER: Status: RESOLVED | Noted: 2020-08-17 | Resolved: 2024-02-21

## 2024-02-21 PROBLEM — Z13.220 ENCOUNTER FOR LIPID SCREENING FOR CARDIOVASCULAR DISEASE: Status: RESOLVED | Noted: 2020-08-17 | Resolved: 2024-02-21

## 2024-02-21 PROBLEM — Z13.6 ENCOUNTER FOR LIPID SCREENING FOR CARDIOVASCULAR DISEASE: Status: RESOLVED | Noted: 2020-08-17 | Resolved: 2024-02-21

## 2024-02-21 PROBLEM — Z00.00 WELL ADULT ON ROUTINE HEALTH CHECK: Status: RESOLVED | Noted: 2020-08-17 | Resolved: 2024-02-21

## 2024-05-10 ENCOUNTER — OFFICE VISIT (OUTPATIENT)
Age: 64
End: 2024-05-10
Payer: COMMERCIAL

## 2024-05-10 VITALS
HEART RATE: 56 BPM | HEIGHT: 70 IN | WEIGHT: 196.8 LBS | OXYGEN SATURATION: 98 % | SYSTOLIC BLOOD PRESSURE: 132 MMHG | DIASTOLIC BLOOD PRESSURE: 72 MMHG | BODY MASS INDEX: 28.18 KG/M2 | TEMPERATURE: 98 F

## 2024-05-10 DIAGNOSIS — Z13.6 SCREENING FOR CARDIOVASCULAR CONDITION: ICD-10-CM

## 2024-05-10 DIAGNOSIS — Z12.5 SCREENING FOR PROSTATE CANCER: ICD-10-CM

## 2024-05-10 DIAGNOSIS — Z00.00 ANNUAL PHYSICAL EXAM: Primary | ICD-10-CM

## 2024-05-10 DIAGNOSIS — Z23 ENCOUNTER FOR IMMUNIZATION: ICD-10-CM

## 2024-05-10 PROCEDURE — 99396 PREV VISIT EST AGE 40-64: CPT | Performed by: INTERNAL MEDICINE

## 2024-05-10 PROCEDURE — 90715 TDAP VACCINE 7 YRS/> IM: CPT

## 2024-05-10 PROCEDURE — 90471 IMMUNIZATION ADMIN: CPT

## 2024-05-10 NOTE — PROGRESS NOTES
ADULT ANNUAL PHYSICAL  [facility] PRIMARY CARE [place]    NAME: Miguel Stephens  AGE: 64 y.o. SEX: male  : 1960     DATE: 5/10/2024     Assessment and Plan:     Problem List Items Addressed This Visit    None      Immunizations and preventive care screenings were discussed with patient today. Appropriate education was printed on patient's after visit summary.    Discussed risks and benefits of prostate cancer screening. We discussed the controversial history of PSA screening for prostate cancer in the [place] as well as the risk of over detection and over treatment of prostate cancer by way of PSA screening.  The patient understands that PSA blood testing is an imperfect way to screen for prostate cancer and that elevated PSA levels in the blood may also be caused by infection, inflammation, prostatic trauma or manipulation, urological procedures, or by benign prostatic enlargement.    The role of the digital rectal examination in prostate cancer screening was also discussed and I discussed with him that there is large interobserver variability in the findings of digital rectal examination.    Counseling:  Alcohol/drug use: discussed moderation in alcohol intake, the recommendations for healthy alcohol use, and avoidance of illicit drug use.  Dental Health: discussed importance of regular tooth brushing, flossing, and dental visits.  Exercise: the importance of regular exercise/physical activity was discussed. Recommend exercise 3-5 times per week for at least 30 minutes.          No follow-ups on file.     Chief Complaint:     Chief Complaint   Patient presents with   • Physical Exam     Annual physical exam      History of Present Illness:     Adult Annual Physical   Patient here for a comprehensive physical exam. The patient reports no problems.    Diet and Physical Activity  Diet/Nutrition: well balanced diet, limited junk food, low carb diet,  consuming 3-5 servings of fruits/vegetables daily, and adequate fiber intake.   Exercise: walking and 1-2 hours on average.      Depression Screening  PHQ-2/9 Depression Screening    Little interest or pleasure in doing things: 0 - not at all  Feeling down, depressed, or hopeless: 0 - not at all  PHQ-2 Score: 0  PHQ-2 Interpretation: Negative depression screen       General Health  Sleep: gets 7-8 hours of sleep on average.   Hearing: normal - bilateral.  Vision: no vision problems.   Dental: regular dental visits.        Health  Symptoms include: none    Advanced Care Planning  Do you have an advanced directive? yes  Do you have a durable medical power of ? yes  ACP document given to patient? yes     Review of Systems:     Review of Systems   Constitutional: Negative.    HENT: Negative.     Eyes: Negative.    Respiratory: Negative.     Cardiovascular: Negative.    Endocrine: Negative.    Genitourinary: Negative.    Musculoskeletal: Negative.    Skin: Negative.    Allergic/Immunologic: Negative.    Neurological: Negative.    Hematological: Negative.    Psychiatric/Behavioral: Negative.        Past Medical History:     Past Medical History:   Diagnosis Date   • Colon polyps 11/2020   • Melanoma (HCC)       Past Surgical History:     Past Surgical History:   Procedure Laterality Date   • SKIN SURGERY  2004    melanoma removal - [facility]      Family History:     Family History   Problem Relation Age of Onset   • Breast cancer Mother    • Cirrhosis Father       Social History:     Social History     Socioeconomic History   • Marital status: /Civil Union     Spouse name: None   • Number of children: None   • Years of education: None   • Highest education level: None   Occupational History   • None   Tobacco Use   • Smoking status: Never   • Smokeless tobacco: Never   Vaping Use   • Vaping status: Never Used   Substance and Sexual Activity   • Alcohol use: Yes     Comment: 1-2 beers on  "occasion   • Drug use: No   • Sexual activity: Yes   Other Topics Concern   • None   Social History Narrative   • None     Social Determinants of Health     Financial Resource Strain: Not on file   Food Insecurity: Not on file   Transportation Needs: Not on file   Physical Activity: Not on file   Stress: Not on file   Social Connections: Not on file   Intimate Partner Violence: Not on file   Housing Stability: Not on file      Current Medications:     Current Outpatient Medications   Medication Sig Dispense Refill   • cholecalciferol (VITAMIN D3) 1,000 units tablet Take 1,000 Units by mouth every other day      • Fexofenadine HCl (ALLEGRA PO) Take by mouth every other day     • meloxicam (Mobic) 15 mg tablet Take 1 tablet (15 mg total) by mouth daily (Patient taking differently: Take 15 mg by mouth daily as needed for mild pain) 30 tablet 0     No current facility-administered medications for this visit.      Allergies:     No Known Allergies   Physical Exam:     /72 (BP Location: Left arm, Patient Position: Sitting, Cuff Size: Large)   Pulse 56   Temp 98 °F (36.7 °C) (Temporal)   Ht 5' 10.28\" (1.785 m)   Wt 89.3 kg (196 lb 12.8 oz)   SpO2 98%   BMI 28.02 kg/m²     Physical Exam  Vitals reviewed.   Constitutional:       Appearance: He is well-developed and normal weight.   HENT:      Head: Normocephalic and atraumatic.      Right Ear: Tympanic membrane, ear canal and external ear normal. There is no impacted cerumen.      Left Ear: Tympanic membrane, ear canal and external ear normal. There is no impacted cerumen.      Nose: Nose normal.      Mouth/Throat:      Mouth: Mucous membranes are moist.      Pharynx: Oropharynx is clear.   Eyes:      General: No scleral icterus.     Conjunctiva/sclera: Conjunctivae normal.      Pupils: Pupils are equal, round, and reactive to light.   Neck:      Vascular: No carotid bruit.   Cardiovascular:      Rate and Rhythm: Normal rate and regular rhythm.      Pulses: " Normal pulses.      Heart sounds: Normal heart sounds. No murmur heard.  Pulmonary:      Effort: Pulmonary effort is normal. No respiratory distress.      Breath sounds: Normal breath sounds.   Abdominal:      General: Abdomen is flat. Bowel sounds are normal. There is no distension.      Palpations: Abdomen is soft. There is no mass.      Tenderness: There is no abdominal tenderness. There is no right CVA tenderness, left CVA tenderness, guarding or rebound.      Hernia: No hernia is present.   Musculoskeletal:         General: No swelling, tenderness or deformity. Normal range of motion.      Cervical back: Normal range of motion and neck supple.      Right lower leg: No edema.      Left lower leg: No edema.   Skin:     General: Skin is warm and dry.      Capillary Refill: Capillary refill takes less than 2 seconds.      Coloration: Skin is not jaundiced.      Findings: No bruising, erythema or rash.   Neurological:      General: No focal deficit present.      Mental Status: He is alert and oriented to person, place, and time. Mental status is at baseline.   Psychiatric:         Mood and Affect: Mood normal.         Behavior: Behavior normal.         Thought Content: Thought content normal.         Judgment: Judgment normal.        Vasquez Haji MD  [facility] PRIMARY CARE [place]

## 2024-08-26 ENCOUNTER — APPOINTMENT (EMERGENCY)
Dept: RADIOLOGY | Facility: HOSPITAL | Age: 64
End: 2024-08-26
Payer: COMMERCIAL

## 2024-08-26 ENCOUNTER — HOSPITAL ENCOUNTER (EMERGENCY)
Facility: HOSPITAL | Age: 64
Discharge: HOME | End: 2024-08-26
Attending: EMERGENCY MEDICINE
Payer: COMMERCIAL

## 2024-08-26 VITALS
HEIGHT: 71 IN | HEART RATE: 60 BPM | SYSTOLIC BLOOD PRESSURE: 198 MMHG | TEMPERATURE: 97.8 F | WEIGHT: 201.72 LBS | DIASTOLIC BLOOD PRESSURE: 98 MMHG | RESPIRATION RATE: 20 BRPM | OXYGEN SATURATION: 98 % | BODY MASS INDEX: 28.24 KG/M2

## 2024-08-26 DIAGNOSIS — S62.609B PHALANX, HAND FRACTURE, OPEN, INITIAL ENCOUNTER: ICD-10-CM

## 2024-08-26 DIAGNOSIS — S61.219A FINGER LACERATION: Primary | ICD-10-CM

## 2024-08-26 PROCEDURE — 99284 EMERGENCY DEPT VISIT MOD MDM: CPT | Performed by: EMERGENCY MEDICINE

## 2024-08-26 PROCEDURE — 99283 EMERGENCY DEPT VISIT LOW MDM: CPT

## 2024-08-26 PROCEDURE — 73140 X-RAY EXAM OF FINGER(S): CPT

## 2024-08-26 PROCEDURE — 12002 RPR S/N/AX/GEN/TRNK2.6-7.5CM: CPT | Performed by: EMERGENCY MEDICINE

## 2024-08-26 RX ORDER — GINSENG 100 MG
2 CAPSULE ORAL ONCE
Status: COMPLETED | OUTPATIENT
Start: 2024-08-26 | End: 2024-08-26

## 2024-08-26 RX ORDER — OXYCODONE AND ACETAMINOPHEN 5; 325 MG/1; MG/1
1 TABLET ORAL EVERY 4 HOURS PRN
Qty: 20 TABLET | Refills: 0 | Status: SHIPPED | OUTPATIENT
Start: 2024-08-26 | End: 2024-09-02

## 2024-08-26 RX ORDER — BUPIVACAINE HYDROCHLORIDE 5 MG/ML
5 INJECTION, SOLUTION EPIDURAL; INTRACAUDAL ONCE
Status: COMPLETED | OUTPATIENT
Start: 2024-08-26 | End: 2024-08-26

## 2024-08-26 RX ORDER — LIDOCAINE HYDROCHLORIDE 20 MG/ML
5 INJECTION, SOLUTION EPIDURAL; INFILTRATION; INTRACAUDAL; PERINEURAL ONCE
Status: COMPLETED | OUTPATIENT
Start: 2024-08-26 | End: 2024-08-26

## 2024-08-26 RX ADMIN — BACITRACIN ZINC 2 SMALL APPLICATION: 500 OINTMENT TOPICAL at 12:32

## 2024-08-26 RX ADMIN — AMOXICILLIN AND CLAVULANATE POTASSIUM 1 TABLET: 875; 125 TABLET, FILM COATED ORAL at 12:32

## 2024-08-26 RX ADMIN — BUPIVACAINE HYDROCHLORIDE 5 ML: 5 INJECTION, SOLUTION EPIDURAL; INTRACAUDAL; PERINEURAL at 10:50

## 2024-08-26 RX ADMIN — LIDOCAINE HYDROCHLORIDE 5 ML: 20 INJECTION, SOLUTION EPIDURAL; INFILTRATION; INTRACAUDAL; PERINEURAL at 10:50

## 2024-08-26 NOTE — DISCHARGE INSTRUCTIONS
Keep clean and dry for 2 to 3 days as we discussed.    Wear splint until bone is healed.    Follow-up suture removal in 10 to 12 days

## 2024-08-26 NOTE — ED PROVIDER NOTES
"History  Chief Complaint   Patient presents with    Laceration     Pt arrives at ER after slamming left hand in double doors of van.  Pt arrives with bandages around pinky and ring finger.  Pt reports ring finger is \"hanging\"     Patient states he slammed the door of his van on the fingers of his left hand within an hour prior to arrival.  Patient pulled his hand out and noticed laceration and deformity of the left ring finger and a laceration across the pad of the left pinky finger.  Denies any other injury.  Patient applied a dressing to control the bleeding and drove himself into the ER.  States his tetanus is current        Prior to Admission Medications   Prescriptions Last Dose Informant Patient Reported? Taking?   Fexofenadine HCl (ALLEGRA PO)   Yes No   Sig: Take by mouth every other day   cholecalciferol (VITAMIN D3) 1,000 units tablet  Self Yes No   Sig: Take 1,000 Units by mouth every other day    meloxicam (Mobic) 15 mg tablet   No No   Sig: Take 1 tablet (15 mg total) by mouth daily   Patient taking differently: Take 15 mg by mouth daily as needed for mild pain      Facility-Administered Medications: None       Past Medical History:   Diagnosis Date    Colon polyps 11/2020    Melanoma (HCC)        Past Surgical History:   Procedure Laterality Date    SKIN SURGERY  2004    melanoma removal - [facility]       Family History   Problem Relation Age of Onset    Breast cancer Mother     Cirrhosis Father      I have reviewed and agree with the history as documented.    E-Cigarette/Vaping    E-Cigarette Use Never User      E-Cigarette/Vaping Substances    Nicotine No     THC No     CBD No     Flavoring No     Other No     Unknown No      Social History     Tobacco Use    Smoking status: Never    Smokeless tobacco: Never   Vaping Use    Vaping status: Never Used   Substance Use Topics    Alcohol use: Yes     Comment: 1-2 beers on occasion    Drug use: No       Review of Systems   Constitutional:  " Negative for chills and fever.   HENT:  Negative for congestion.    Eyes:  Negative for visual disturbance.   Respiratory:  Negative for cough and shortness of breath.    Cardiovascular:  Negative for chest pain.   Gastrointestinal:  Negative for abdominal pain and vomiting.   Genitourinary:  Negative for dysuria.   Musculoskeletal:  Positive for arthralgias and joint swelling.   Skin:  Positive for wound.   Neurological:  Negative for weakness, numbness and headaches.   Hematological:  Does not bruise/bleed easily.   All other systems reviewed and are negative.      Physical Exam  Physical Exam  Vitals and nursing note reviewed.   Constitutional:       Appearance: Normal appearance.   HENT:      Head: Normocephalic.      Right Ear: External ear normal.      Left Ear: External ear normal.      Nose: Nose normal.      Mouth/Throat:      Mouth: Mucous membranes are moist.   Eyes:      Conjunctiva/sclera: Conjunctivae normal.   Cardiovascular:      Rate and Rhythm: Normal rate.      Pulses: Normal pulses.   Pulmonary:      Effort: Pulmonary effort is normal.   Abdominal:      Tenderness: There is no abdominal tenderness.   Musculoskeletal:         General: Tenderness, deformity and signs of injury present.      Cervical back: Normal range of motion.   Skin:     General: Skin is warm.      Capillary Refill: Capillary refill takes less than 2 seconds.      Comments: Patient is a 1.5 cm laceration on the volar aspect of the pinky.    Patient also has a laceration across the base of the nail standing to either side of the soft tissue on the dorsal aspect of the fourth ring finger   Neurological:      General: No focal deficit present.      Mental Status: He is alert.   Psychiatric:         Mood and Affect: Mood normal.         Behavior: Behavior normal.         Vital Signs  ED Triage Vitals [08/26/24 1043]   Temperature Pulse Respirations Blood Pressure SpO2   97.8 °F (36.6 °C) 60 20 (!) 198/98 98 %      Temp Source Heart  Rate Source Patient Position - Orthostatic VS BP Location FiO2 (%)   Tympanic Monitor Lying Right arm --      Pain Score       9           Vitals:    08/26/24 1043   BP: (!) 198/98   Pulse: 60   Patient Position - Orthostatic VS: Lying         Visual Acuity      ED Medications  Medications   lidocaine (PF) (XYLOCAINE-MPF) 2 % injection 5 mL (5 mL Infiltration Given 8/26/24 1050)   bupivacaine (PF) (MARCAINE) 0.5 % injection 5 mL (5 mL Injection Given 8/26/24 1050)   amoxicillin-clavulanate (AUGMENTIN) 875-125 mg per tablet 1 tablet (1 tablet Oral Given 8/26/24 1232)   bacitracin topical ointment 2 small application (2 small application Topical Given 8/26/24 1232)       Diagnostic Studies  Results Reviewed       None                   XR finger fourth digit-ring LEFT    (Results Pending)              Procedures  Universal Protocol:  Consent given by: patient  Laceration repair    Date/Time: 8/26/2024 12:07 PM    Performed by: Oral Brown MD  Authorized by: Oral Brown MD  Body area: upper extremity  Location details: left ring finger  Laceration length: 2.5 cm  Tendon involvement: none  Anesthesia: digital block    Anesthesia:  Local Anesthetic: lidocaine 2% without epinephrine and bupivacaine 0.5% without epinephrine    Sedation:  Patient sedated: no        Procedure Details:  Irrigation solution: saline  Irrigation method: syringe  Amount of cleaning: standard  Skin closure: 5-0 nylon  Number of sutures: 6  Technique: simple  Approximation: close  Approximation difficulty: simple  Dressing: antibiotic ointment and gauze roll  Patient tolerance: patient tolerated the procedure well with no immediate complications      Universal Protocol:  Consent given by: patient  Patient identity confirmed: verbally with patient  Laceration repair    Date/Time: 8/26/2024 12:08 PM    Performed by: Oral Brown MD  Authorized by: Oral Brown MD  Body area: upper extremity  Location details: left small  finger  Laceration length: 1.5 cm  Anesthesia: local infiltration    Anesthesia:  Local Anesthetic: lidocaine 2% without epinephrine      Procedure Details:  Irrigation solution: saline  Irrigation method: syringe  Amount of cleaning: standard  Skin closure: 5-0 nylon  Number of sutures: 6  Technique: simple  Approximation: close  Approximation difficulty: simple  Dressing: antibiotic ointment  Patient tolerance: patient tolerated the procedure well with no immediate complications               ED Course                                               Medical Decision Making  The ring finger has a fractured but nondisplaced distal phalanx.  Repair as above.  Dressed and splinted.  The fifth finger was repaired under local    Amount and/or Complexity of Data Reviewed  Radiology: ordered.    Risk  OTC drugs.  Prescription drug management.                 Disposition  Final diagnoses:   Finger laceration   Phalanx, hand fracture, open, initial encounter     Time reflects when diagnosis was documented in both MDM as applicable and the Disposition within this note       Time User Action Codes Description Comment    8/26/2024 12:34 PM Oral Brown Add [S61.219A] Finger laceration     8/26/2024 12:34 PM Oral Brown Add [S62.609B] Phalanx, hand fracture, open, initial encounter           ED Disposition       ED Disposition   Discharge    Condition   Stable    Date/Time   Mon Aug 26, 2024 12:34 PM    Comment   Miguel Stephens discharge to home/self care.                   Follow-up Information       Follow up With Specialties Details Why Contact Info Additional Information    Vasquez Haji MD Internal Medicine Schedule an appointment as soon as possible for a visit   [address] [place] [address] [phone]       [facility]ly [facility] Emergency Medicine   [address] [place] [address]  [phone] [facility],  [address], [place], [address]            Patient's Medications   Discharge Prescriptions    AMOXICILLIN-CLAVULANATE (AUGMENTIN) 875-125 MG PER TABLET    Take 1 tablet by mouth every 12 (twelve) hours for 7 days       Start Date: 8/26/2024 End Date: 9/2/2024       Order Dose: 1 tablet       Quantity: 14 tablet    Refills: 0    OXYCODONE-ACETAMINOPHEN (PERCOCET) 5-325 MG PER TABLET    Take 1 tablet by mouth every 4 (four) hours as needed for severe pain for up to 7 days Max Daily Amount: 6 tablets       Start Date: 8/26/2024 End Date: 9/2/2024       Order Dose: 1 tablet       Quantity: 20 tablet    Refills: 0       No discharge procedures on file.    PDMP Review       None            ED Provider  Electronically Signed by             Oral Brown MD  08/26/24 6967

## 2024-09-11 ENCOUNTER — OFFICE VISIT (OUTPATIENT)
Age: 64
End: 2024-09-11
Payer: COMMERCIAL

## 2024-09-11 VITALS
HEART RATE: 55 BPM | OXYGEN SATURATION: 99 % | HEIGHT: 70 IN | TEMPERATURE: 98.1 F | DIASTOLIC BLOOD PRESSURE: 90 MMHG | BODY MASS INDEX: 27.83 KG/M2 | SYSTOLIC BLOOD PRESSURE: 160 MMHG | WEIGHT: 194.4 LBS

## 2024-09-11 DIAGNOSIS — S62.665D: ICD-10-CM

## 2024-09-11 DIAGNOSIS — R03.0 ELEVATED BP WITHOUT DIAGNOSIS OF HYPERTENSION: ICD-10-CM

## 2024-09-11 DIAGNOSIS — S61.219S FINGER LACERATION, SEQUELA: Primary | ICD-10-CM

## 2024-09-11 PROCEDURE — 99214 OFFICE O/P EST MOD 30 MIN: CPT | Performed by: NURSE PRACTITIONER

## 2024-09-11 NOTE — PROGRESS NOTES
Assessment/Plan:    Problem List Items Addressed This Visit       Finger laceration, sequela - Primary     - ED note reviewed from 8/26  - sutures removed in office today from left 4th and 5th digits  - only 5 sutures present in the 4th digit despite procedure note stating 6.   - advised to use wash daily with mild soap and water, pat dry. Apply bacitracin daily x 5 days   - keep covered until fully healed         Open nondisplaced fracture of distal phalanx of left ring finger with routine healing     - reviewed ED visit from 8/26  - treated with augmentin, completed  - advised to keep splint on until healed  - advised to follow up with PCP in 4 weeks, continue splinting   - pain controlled         Elevated BP without diagnosis of hypertension     - patient admits to being anxious today. He has no hx of HTN.   - start monitoring BP at home 4x a week  - bring home log with to follow up in 4 weeks                    [employer] software was used to dictate this note.  It may contain errors with dictating incorrect words or incorrect spelling. Please contact the provider directly with any questions.    Subjective:      Patient ID: Miguel Stephens is a 64 y.o. male.    HPI    Patient presents today for suture removal.  He was seen in the ED on 8/26 with lacerations to his left 4th and 5th digits. He also was found to have a nondisplaced, transverse fracture of the fourth distal phalanx. He was advised to keep the finger in a splint until the bone was healed. He received 6 sutures in each of his 2 fingers. He was treated with 7 days of augmentin and oxy for pain.   He reported being UTD on his tetanus.   Injury occurred slamming a door on his fingers.     Suture removal    Date/Time: 9/11/2024 1:00 PM    Performed by: TAMARA Matson  Authorized by: TAMARA Matson  Universal Protocol:  procedure performed by consultantConsent: Verbal consent obtained.  Risks and benefits: risks, benefits and alternatives  were discussed  Consent given by: patient  Patient understanding: patient states understanding of the procedure being performed  Patient identity confirmed: verbally with patient      Patient location:  Clinic  Location:     Laterality:  Left    Location: 4th finger.  Procedure details:     Tools used:  Suture removal kit    Wound appearance:  No sign(s) of infection    Number of sutures removed:  5  Post-procedure details:     Post-removal:  Antibiotic ointment applied and dressing applied    Patient tolerance of procedure:  Tolerated well, no immediate complications  Suture removal    Date/Time: 9/11/2024 1:00 PM    Performed by: TAMARA Matson  Authorized by: TAMARA Matson  Universal Protocol:  procedure performed by consultantConsent: Verbal consent obtained.  Risks and benefits: risks, benefits and alternatives were discussed  Consent given by: patient  Patient understanding: patient states understanding of the procedure being performed  Patient identity confirmed: verbally with patient      Patient location:  Clinic  Location:     Laterality:  Left    Location: 5th finger.  Procedure details:     Tools used:  Suture removal kit    Wound appearance:  No sign(s) of infection    Number of sutures removed:  6  Post-procedure details:     Post-removal:  Antibiotic ointment applied and Band-Aid applied    Patient tolerance of procedure:  Tolerated well, no immediate complications          The following portions of the patient's history were reviewed and updated as appropriate: allergies, current medications, past family history, past medical history, past social history, past surgical history, and problem list.    Review of Systems   Constitutional:  Negative for chills and fever.   Musculoskeletal:  Positive for arthralgias (4th finger).   Skin:  Positive for wound.         Past Medical History:   Diagnosis Date    Colon polyps 11/2020    Melanoma (HCC)          Current Outpatient Medications:     " cholecalciferol (VITAMIN D3) 1,000 units tablet, Take 1,000 Units by mouth every other day , Disp: , Rfl:     No Known Allergies    Social History   Past Surgical History:   Procedure Laterality Date    SKIN SURGERY  2004    melanoma removal - [facility]     Family History   Problem Relation Age of Onset    Breast cancer Mother     Cirrhosis Father        Objective:  /90 (BP Location: Left arm, Patient Position: Sitting, Cuff Size: Large)   Pulse 55   Temp 98.1 °F (36.7 °C) (Temporal)   Ht 5' 10.16\" (1.782 m)   Wt 88.2 kg (194 lb 6.4 oz)   SpO2 99%   BMI 27.77 kg/m²      Physical Exam  Vitals reviewed.   Constitutional:       General: He is not in acute distress.     Appearance: Normal appearance. He is not diaphoretic.   HENT:      Head: Normocephalic and atraumatic.   Pulmonary:      Effort: Pulmonary effort is normal. No respiratory distress.   Musculoskeletal:      Comments: Left 4th finger with healing laceration. +dried blood and glue present. Mild ecchymosis of finger present.   Only 5 sutures present     Left 5th digit with healing laceration - 6 sutures present. No erythema or drainage. No swelling    Neurological:      Mental Status: He is alert and oriented to person, place, and time. Mental status is at baseline.   Psychiatric:         Mood and Affect: Mood normal.         Behavior: Behavior normal.           "

## 2024-09-11 NOTE — ASSESSMENT & PLAN NOTE
- patient admits to being anxious today. He has no hx of HTN.   - start monitoring BP at home 4x a week  - bring home log with to follow up in 4 weeks

## 2024-09-11 NOTE — ASSESSMENT & PLAN NOTE
- ED note reviewed from 8/26  - sutures removed in office today from left 4th and 5th digits  - only 5 sutures present in the 4th digit despite procedure note stating 6.   - advised to use wash daily with mild soap and water, pat dry. Apply bacitracin daily x 5 days   - keep covered until fully healed

## 2024-09-11 NOTE — ASSESSMENT & PLAN NOTE
- reviewed ED visit from 8/26  - treated with augmentin, completed  - advised to keep splint on until healed  - advised to follow up with PCP in 4 weeks, continue splinting   - pain controlled

## 2024-10-11 PROBLEM — S61.219S FINGER LACERATION, SEQUELA: Status: RESOLVED | Noted: 2024-09-11 | Resolved: 2024-10-11

## 2024-10-16 ENCOUNTER — OFFICE VISIT (OUTPATIENT)
Age: 64
End: 2024-10-16
Payer: COMMERCIAL

## 2024-10-16 VITALS
HEIGHT: 70 IN | SYSTOLIC BLOOD PRESSURE: 148 MMHG | HEART RATE: 58 BPM | DIASTOLIC BLOOD PRESSURE: 80 MMHG | BODY MASS INDEX: 28.06 KG/M2 | WEIGHT: 196 LBS | OXYGEN SATURATION: 98 % | TEMPERATURE: 97.3 F

## 2024-10-16 DIAGNOSIS — I10 PRIMARY HYPERTENSION: Primary | ICD-10-CM

## 2024-10-16 PROCEDURE — 99213 OFFICE O/P EST LOW 20 MIN: CPT | Performed by: INTERNAL MEDICINE

## 2024-10-16 RX ORDER — VALSARTAN 80 MG/1
80 TABLET ORAL DAILY
Qty: 100 TABLET | Refills: 3 | Status: SHIPPED | OUTPATIENT
Start: 2024-10-16

## 2024-10-16 NOTE — ASSESSMENT & PLAN NOTE
We will initiate valsartan at 80 mg daily.  Patient was encouraged to obtain his blood work prior to his follow-up visit which we will schedule in approximately 6 months.  Orders:    valsartan (DIOVAN) 80 mg tablet; Take 1 tablet (80 mg total) by mouth daily    Comprehensive metabolic panel

## 2024-12-18 ENCOUNTER — APPOINTMENT (OUTPATIENT)
Dept: LAB | Age: 64
End: 2024-12-18
Payer: COMMERCIAL

## 2024-12-18 DIAGNOSIS — Z13.6 SCREENING FOR CARDIOVASCULAR CONDITION: ICD-10-CM

## 2024-12-18 DIAGNOSIS — Z12.5 SCREENING FOR PROSTATE CANCER: ICD-10-CM

## 2024-12-18 DIAGNOSIS — Z00.00 ANNUAL PHYSICAL EXAM: ICD-10-CM

## 2024-12-18 LAB
ALBUMIN SERPL BCG-MCNC: 4.5 G/DL (ref 3.5–5)
ALP SERPL-CCNC: 46 U/L (ref 34–104)
ALT SERPL W P-5'-P-CCNC: 25 U/L (ref 7–52)
ANION GAP SERPL CALCULATED.3IONS-SCNC: 5 MMOL/L (ref 4–13)
AST SERPL W P-5'-P-CCNC: 22 U/L (ref 13–39)
BILIRUB SERPL-MCNC: 0.53 MG/DL (ref 0.2–1)
BUN SERPL-MCNC: 16 MG/DL (ref 5–25)
CALCIUM SERPL-MCNC: 9.9 MG/DL (ref 8.4–10.2)
CHLORIDE SERPL-SCNC: 105 MMOL/L (ref 96–108)
CHOLEST SERPL-MCNC: 243 MG/DL (ref ?–200)
CO2 SERPL-SCNC: 29 MMOL/L (ref 21–32)
CREAT SERPL-MCNC: 1 MG/DL (ref 0.6–1.3)
ERYTHROCYTE [DISTWIDTH] IN BLOOD BY AUTOMATED COUNT: 11.9 % (ref 11.6–15.1)
GFR SERPL CREATININE-BSD FRML MDRD: 79 ML/MIN/1.73SQ M
GLUCOSE P FAST SERPL-MCNC: 105 MG/DL (ref 65–99)
HCT VFR BLD AUTO: 44.9 % (ref 36.5–49.3)
HDLC SERPL-MCNC: 58 MG/DL
HGB BLD-MCNC: 14.3 G/DL (ref 12–17)
LDLC SERPL CALC-MCNC: 145 MG/DL (ref 0–100)
MCH RBC QN AUTO: 27.8 PG (ref 26.8–34.3)
MCHC RBC AUTO-ENTMCNC: 31.8 G/DL (ref 31.4–37.4)
MCV RBC AUTO: 87 FL (ref 82–98)
NONHDLC SERPL-MCNC: 185 MG/DL
PLATELET # BLD AUTO: 212 THOUSANDS/UL (ref 149–390)
PMV BLD AUTO: 10.5 FL (ref 8.9–12.7)
POTASSIUM SERPL-SCNC: 5.6 MMOL/L (ref 3.5–5.3)
PROT SERPL-MCNC: 7.3 G/DL (ref 6.4–8.4)
PSA SERPL-MCNC: 0.64 NG/ML (ref 0–4)
RBC # BLD AUTO: 5.15 MILLION/UL (ref 3.88–5.62)
SODIUM SERPL-SCNC: 139 MMOL/L (ref 135–147)
TRIGL SERPL-MCNC: 200 MG/DL (ref ?–150)
WBC # BLD AUTO: 4.76 THOUSAND/UL (ref 4.31–10.16)

## 2024-12-18 PROCEDURE — G0103 PSA SCREENING: HCPCS

## 2024-12-18 PROCEDURE — 36415 COLL VENOUS BLD VENIPUNCTURE: CPT

## 2024-12-18 PROCEDURE — 80061 LIPID PANEL: CPT

## 2024-12-18 PROCEDURE — 85027 COMPLETE CBC AUTOMATED: CPT

## 2025-02-15 ENCOUNTER — HOSPITAL ENCOUNTER (EMERGENCY)
Facility: HOSPITAL | Age: 65
Discharge: HOME | End: 2025-02-15
Attending: EMERGENCY MEDICINE
Payer: COMMERCIAL

## 2025-02-15 VITALS
TEMPERATURE: 97 F | HEART RATE: 68 BPM | DIASTOLIC BLOOD PRESSURE: 96 MMHG | SYSTOLIC BLOOD PRESSURE: 161 MMHG | OXYGEN SATURATION: 99 % | WEIGHT: 193 LBS | RESPIRATION RATE: 18 BRPM | BODY MASS INDEX: 27.57 KG/M2

## 2025-02-15 DIAGNOSIS — Z48.00 ENCOUNTER FOR REMOVAL OF NASAL PACKING: ICD-10-CM

## 2025-02-15 DIAGNOSIS — R04.0 EPISTAXIS: Primary | ICD-10-CM

## 2025-02-15 PROCEDURE — 99282 EMERGENCY DEPT VISIT SF MDM: CPT

## 2025-02-15 PROCEDURE — 99283 EMERGENCY DEPT VISIT LOW MDM: CPT | Performed by: EMERGENCY MEDICINE

## 2025-02-15 NOTE — ED PROVIDER NOTES
"Time reflects when diagnosis was documented in both MDM as applicable and the Disposition within this note       Time User Action Codes Description Comment    2/15/2025  8:51 AM BronwynanalyJerman Add [Z48.00] Encounter for removal of nasal packing     2/15/2025  8:52 AM BronwynanalyJerman Add [R04.0] Epistaxis     2/15/2025  8:52 AM BronwynanalyJerman Modify [Z48.00] Encounter for removal of nasal packing     2/15/2025  8:52 AM BronwynanalyJerman Modify [R04.0] Epistaxis           ED Disposition       ED Disposition   Discharge    Condition   Stable    Date/Time   Sat Feb 15, 2025  8:51 AM    Comment   Miguel Stephens discharge to home/self care.                   Assessment & Plan       Medical Decision Making  Patient is a 64 y.o. male with PMH of hypertension who presents to the ED with right nose bleeding.    Vital signs stable. On exam small laceration to the septum in the right nare, no active bleeding, right nasal packing with small pledget.    History and physical exam most consistent with epistaxis.     Plan: Removal of nasal packing, found to have no active bleeding, will clinically monitor    View ED course above for further discussion on patient workup.     On review of previous records reviewed medical.    All labs reviewed and utilized in the medical decision making process  All radiology studies independently viewed by me and interpreted by the radiologist.  I reviewed all testing with the patient.     Upon re-evaluation continues to have no active bleeding, stable for discharge.  Patient was informed since nasal packing was removed, no need to take antibiotics.  Recommended humidifier, use of mask anytime working with fine particles or tile.    Disposition: Discharge    Portions of the record may have been created with voice recognition software. Occasional wrong word or \"sound a like\" substitutions may have occurred due to the inherent limitations of voice recognition software. Read the " chart carefully and recognize, using context, where substitutions have occurred.          ED Course as of 02/15/25 0920   Sat Feb 15, 2025   0851 Patient continues to have no active bleeding, stable for discharge from the emergency department       Medications - No data to display    ED Risk Strat Scores                                                History of Present Illness       Chief Complaint   Patient presents with    Nose Bleed Re-evaluation       Past Medical History:   Diagnosis Date    Colon polyps 11/2020    Melanoma (HCC)       Past Surgical History:   Procedure Laterality Date    SKIN SURGERY  2004    melanoma removal - [facility]      Family History   Problem Relation Age of Onset    Breast cancer Mother     Cirrhosis Father       Social History     Tobacco Use    Smoking status: Never    Smokeless tobacco: Never   Vaping Use    Vaping status: Never Used   Substance Use Topics    Alcohol use: Yes     Comment: 1-2 beers on occasion    Drug use: No      E-Cigarette/Vaping    E-Cigarette Use Never User       E-Cigarette/Vaping Substances    Nicotine No     THC No     CBD No     Flavoring No     Other No     Unknown No       I have reviewed and agree with the history as documented.     58-year-old male history of hypertension presenting to emergency department for epistaxis.  Yesterday was tiling bathroom, not wearing mask, developed nosebleed in the right nare later that evening.  Presented to urgent care, treated with nasal saline spray and had pledget placed in the right nare with cessation of bleeding.  Patient prescribed antibiotics due to nasal packing, however did not pick them up yet.  States overnight, bleeding continued, felt fluid running down around pledget from the right nare.    Pledget removed in ED, no active bleeding observed.  Denies any pain, denies any additional symptoms denies fever.          Review of Systems   Constitutional:  Negative for chills and fever.   HENT:   Positive for nosebleeds and sore throat. Negative for congestion, ear pain and rhinorrhea.    Respiratory:  Negative for shortness of breath.    Gastrointestinal:  Negative for nausea and vomiting.   Skin:  Negative for rash.   Neurological:  Negative for light-headedness and headaches.   All other systems reviewed and are negative.          Objective       ED Triage Vitals [02/15/25 0808]   Temperature Pulse Blood Pressure Respirations SpO2 Patient Position - Orthostatic VS   (!) 97 °F (36.1 °C) 68 161/96 18 99 % Sitting      Temp Source Heart Rate Source BP Location FiO2 (%) Pain Score    Temporal -- Left arm -- No Pain      Vitals      Date and Time Temp Pulse SpO2 Resp BP Pain Score FACES Pain Rating User   02/15/25 0808 97 °F (36.1 °C) 68 99 % 18 161/96 No Pain -- CS            Physical Exam  Vitals and nursing note reviewed.   Constitutional:       General: He is not in acute distress.     Appearance: Normal appearance. He is not toxic-appearing.   HENT:      Head: Normocephalic and atraumatic.      Nose: Laceration present.      Right Nostril: No foreign body, epistaxis, septal hematoma or occlusion.      Left Nostril: No foreign body, epistaxis, septal hematoma or occlusion.      Comments: Small 1 cm thin laceration to the inner septum of the right nare, no active bleeding.  Dried blood in patient's mustache underneath right nare.    Upon initial examination, nasal packing in the right nare, taped to the right side of the face.     Mouth/Throat:      Mouth: Mucous membranes are moist.   Eyes:      Conjunctiva/sclera: Conjunctivae normal.   Musculoskeletal:         General: Normal range of motion.   Skin:     General: Skin is warm and dry.   Neurological:      General: No focal deficit present.      Mental Status: He is alert.         Results Reviewed       None            No orders to display       Procedures    ED Medication and Procedure Management   Prior to Admission Medications   Prescriptions Last Dose  Informant Patient Reported? Taking?   cholecalciferol (VITAMIN D3) 1,000 units tablet  Self Yes No   Sig: Take 1,000 Units by mouth every other day    valsartan (DIOVAN) 80 mg tablet   No No   Sig: Take 1 tablet (80 mg total) by mouth daily      Facility-Administered Medications: None     Discharge Medication List as of 2/15/2025  8:54 AM        CONTINUE these medications which have NOT CHANGED    Details   cholecalciferol (VITAMIN D3) 1,000 units tablet Take 1,000 Units by mouth every other day , Historical Med      valsartan (DIOVAN) 80 mg tablet Take 1 tablet (80 mg total) by mouth daily, Starting Wed 10/16/2024, Normal           No discharge procedures on file.  ED SEPSIS DOCUMENTATION   Time reflects when diagnosis was documented in both MDM as applicable and the Disposition within this note       Time User Action Codes Description Comment    2/15/2025  8:51 AM Jerman Diehl [Z48.00] Encounter for removal of nasal packing     2/15/2025  8:52 AM Jerman Diehl [R04.0] Epistaxis     2/15/2025  8:52 AM Jerman Diehl Modify [Z48.00] Encounter for removal of nasal packing     2/15/2025  8:52 AM Jerman Diehl Modify [R04.0] Epistaxis                  Jerman Diehl DO  02/15/25 0920

## 2025-02-15 NOTE — ED ATTENDING ATTESTATION
2/15/2025  IBabar MD, saw and evaluated the patient. I have discussed the patient with the resident/non-physician practitioner and agree with the resident's/non-physician practitioner's findings, Plan of Care, and MDM as documented in the resident's/non-physician practitioner's note, except where noted. All available labs and Radiology studies were reviewed.  I was present for key portions of any procedure(s) performed by the resident/non-physician practitioner and I was immediately available to provide assistance.       At this point I agree with the current assessment done in the Emergency Department.  I have conducted an independent evaluation of this patient a history and physical is as follows:    ED Course  ED Course as of 02/15/25 0931   Sat Feb 15, 2025   0830 Per resident h&p 63 YO M presents for intermittent epistaxis since yesterday; was in urgent care last night; had a pledget placed; O: pledget removed; no bleeding; small non bleeding laceration in R nares I/P epistaxis resolved     Emergency Department Note- Miguel Stephens 64 y.o. male MRN: [id number]    Unit/Bed#: ED 02 Encounter: [id number]    Miguel Stephens is a 64 y.o. male who presents with   Chief Complaint   Patient presents with    Nose Bleed Re-evaluation         History of Present Illness   HPI:  Miguel Stephens is a 64 y.o. male who presents for evaluation of:  Recurrent epistaxis.  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  Patient went to urgent care for the epistaxis and had placement of a Rhino Rocket with resolution of bleeding.  Patient does not take any anticoagulants or antiplatelet medications.  He had a recurrent nosebleed this morning prompting a visit to the ED.  Patient is not currently bleeding at the time my evaluation is asymptomatic.    Review of Systems   Constitutional:  Negative for fatigue and fever.   HENT:  Positive for nosebleeds. Negative for congestion and sore throat.     Respiratory:  Negative for cough and shortness of breath.    Cardiovascular:  Negative for chest pain and palpitations.   Gastrointestinal:  Negative for abdominal pain and nausea.   Genitourinary:  Negative for flank pain and frequency.   Neurological:  Negative for light-headedness and headaches.   Psychiatric/Behavioral:  Negative for dysphoric mood and hallucinations.    All other systems reviewed and are negative.      Historical Information   Past Medical History:   Diagnosis Date    Colon polyps 11/2020    Melanoma (HCC)      Past Surgical History:   Procedure Laterality Date    SKIN SURGERY  2004    melanoma removal - [facility]     Social History   Social History     Substance and Sexual Activity   Alcohol Use Yes    Comment: 1-2 beers on occasion     Social History     Substance and Sexual Activity   Drug Use No     Social History     Tobacco Use   Smoking Status Never   Smokeless Tobacco Never     Family History:   Family History   Problem Relation Age of Onset    Breast cancer Mother     Cirrhosis Father        Meds/Allergies   PTA meds:   Prior to Admission Medications   Prescriptions Last Dose Informant Patient Reported? Taking?   cholecalciferol (VITAMIN D3) 1,000 units tablet  Self Yes No   Sig: Take 1,000 Units by mouth every other day    valsartan (DIOVAN) 80 mg tablet   No No   Sig: Take 1 tablet (80 mg total) by mouth daily      Facility-Administered Medications: None     No Known Allergies    Objective   First Vitals:   Blood Pressure: 161/96 (02/15/25 0808)  Pulse: 68 (02/15/25 0808)  Temperature: (!) 97 °F (36.1 °C) (02/15/25 0808)  Temp Source: Temporal (02/15/25 0808)  Respirations: 18 (02/15/25 0808)  Weight - Scale: 87.5 kg (193 lb) (02/15/25 0808)  SpO2: 99 % (02/15/25 0808)    Current Vitals:   Blood Pressure: 161/96 (02/15/25 0808)  Pulse: 68 (02/15/25 0808)  Temperature: (!) 97 °F (36.1 °C) (02/15/25 0808)  Temp Source: Temporal (02/15/25 0808)  Respirations: 18  "(02/15/25 0808)  Weight - Scale: 87.5 kg (193 lb) (02/15/25 0808)  SpO2: 99 % (02/15/25 0808)    No intake or output data in the 24 hours ending 02/15/25 0931    Invasive Devices       None                   Physical Exam  Vitals and nursing note reviewed.   Constitutional:       General: He is not in acute distress.     Appearance: Normal appearance. He is well-developed.   HENT:      Head: Normocephalic and atraumatic.      Right Ear: External ear normal.      Left Ear: External ear normal.      Nose: Nose normal.        Comments: Dried blood by right nares     Mouth/Throat:      Pharynx: No oropharyngeal exudate.   Eyes:      Conjunctiva/sclera: Conjunctivae normal.      Pupils: Pupils are equal, round, and reactive to light.   Cardiovascular:      Rate and Rhythm: Normal rate and regular rhythm.   Pulmonary:      Effort: Pulmonary effort is normal. No respiratory distress.   Abdominal:      General: Abdomen is flat. There is no distension.      Palpations: Abdomen is soft.   Musculoskeletal:         General: No deformity. Normal range of motion.      Cervical back: Normal range of motion and neck supple.   Skin:     General: Skin is warm and dry.      Capillary Refill: Capillary refill takes less than 2 seconds.   Neurological:      General: No focal deficit present.      Mental Status: He is alert and oriented to person, place, and time. Mental status is at baseline.      Coordination: Coordination normal.   Psychiatric:         Mood and Affect: Mood normal.         Behavior: Behavior normal.         Thought Content: Thought content normal.         Judgment: Judgment normal.           Medical Decision Makin.  Epistaxis right nares resolved; follow-up with PCP as an outpatient.    No results found for this or any previous visit (from the past 36 hours).  No orders to display         Portions of the record may have been created with voice recognition software. Occasional wrong word or \"sound a like\" " substitutions may have occurred due to the inherent limitations of voice recognition software.  Read the chart carefully and recognize, using context, where substitutions have occurred.        Critical Care Time  Procedures

## 2025-03-10 ENCOUNTER — HOSPITAL ENCOUNTER (EMERGENCY)
Facility: HOSPITAL | Age: 65
Discharge: HOME | End: 2025-03-10
Attending: EMERGENCY MEDICINE | Admitting: EMERGENCY MEDICINE
Payer: COMMERCIAL

## 2025-03-10 VITALS
OXYGEN SATURATION: 97 % | TEMPERATURE: 97.7 F | SYSTOLIC BLOOD PRESSURE: 166 MMHG | HEART RATE: 80 BPM | RESPIRATION RATE: 18 BRPM | DIASTOLIC BLOOD PRESSURE: 103 MMHG

## 2025-03-10 DIAGNOSIS — R04.0 EPISTAXIS: Primary | ICD-10-CM

## 2025-03-10 LAB
ALBUMIN SERPL BCG-MCNC: 4.5 G/DL (ref 3.5–5)
ALP SERPL-CCNC: 48 U/L (ref 34–104)
ALT SERPL W P-5'-P-CCNC: 17 U/L (ref 7–52)
ANION GAP SERPL CALCULATED.3IONS-SCNC: 9 MMOL/L (ref 4–13)
APTT PPP: 24 SECONDS (ref 23–34)
AST SERPL W P-5'-P-CCNC: 18 U/L (ref 13–39)
BASOPHILS # BLD AUTO: 0.02 THOUSANDS/ÂΜL (ref 0–0.1)
BASOPHILS NFR BLD AUTO: 0 % (ref 0–1)
BILIRUB SERPL-MCNC: 1.01 MG/DL (ref 0.2–1)
BUN SERPL-MCNC: 18 MG/DL (ref 5–25)
CALCIUM SERPL-MCNC: 9.7 MG/DL (ref 8.4–10.2)
CHLORIDE SERPL-SCNC: 103 MMOL/L (ref 96–108)
CO2 SERPL-SCNC: 26 MMOL/L (ref 21–32)
CREAT SERPL-MCNC: 1.04 MG/DL (ref 0.6–1.3)
EOSINOPHIL # BLD AUTO: 0.09 THOUSAND/ÂΜL (ref 0–0.61)
EOSINOPHIL NFR BLD AUTO: 2 % (ref 0–6)
ERYTHROCYTE [DISTWIDTH] IN BLOOD BY AUTOMATED COUNT: 11.9 % (ref 11.6–15.1)
GFR SERPL CREATININE-BSD FRML MDRD: 75 ML/MIN/1.73SQ M
GLUCOSE SERPL-MCNC: 117 MG/DL (ref 65–140)
HCT VFR BLD AUTO: 42.7 % (ref 36.5–49.3)
HGB BLD-MCNC: 14.3 G/DL (ref 12–17)
IMM GRANULOCYTES # BLD AUTO: 0.03 THOUSAND/UL (ref 0–0.2)
IMM GRANULOCYTES NFR BLD AUTO: 1 % (ref 0–2)
INR PPP: 0.95 (ref 0.85–1.19)
LYMPHOCYTES # BLD AUTO: 1.48 THOUSANDS/ÂΜL (ref 0.6–4.47)
LYMPHOCYTES NFR BLD AUTO: 27 % (ref 14–44)
MCH RBC QN AUTO: 28.1 PG (ref 26.8–34.3)
MCHC RBC AUTO-ENTMCNC: 33.5 G/DL (ref 31.4–37.4)
MCV RBC AUTO: 84 FL (ref 82–98)
MONOCYTES # BLD AUTO: 0.39 THOUSAND/ÂΜL (ref 0.17–1.22)
MONOCYTES NFR BLD AUTO: 7 % (ref 4–12)
NEUTROPHILS # BLD AUTO: 3.49 THOUSANDS/ÂΜL (ref 1.85–7.62)
NEUTS SEG NFR BLD AUTO: 63 % (ref 43–75)
NRBC BLD AUTO-RTO: 0 /100 WBCS
PLATELET # BLD AUTO: 218 THOUSANDS/UL (ref 149–390)
PMV BLD AUTO: 9.6 FL (ref 8.9–12.7)
POTASSIUM SERPL-SCNC: 4.4 MMOL/L (ref 3.5–5.3)
PROT SERPL-MCNC: 7.5 G/DL (ref 6.4–8.4)
PROTHROMBIN TIME: 13 SECONDS (ref 12.3–15)
RBC # BLD AUTO: 5.08 MILLION/UL (ref 3.88–5.62)
SODIUM SERPL-SCNC: 138 MMOL/L (ref 135–147)
WBC # BLD AUTO: 5.5 THOUSAND/UL (ref 4.31–10.16)

## 2025-03-10 PROCEDURE — 80053 COMPREHEN METABOLIC PANEL: CPT

## 2025-03-10 PROCEDURE — 99284 EMERGENCY DEPT VISIT MOD MDM: CPT | Performed by: EMERGENCY MEDICINE

## 2025-03-10 PROCEDURE — 30905 CONTROL OF NOSEBLEED: CPT | Performed by: OTOLARYNGOLOGY

## 2025-03-10 PROCEDURE — 30903 CONTROL OF NOSEBLEED: CPT | Performed by: EMERGENCY MEDICINE

## 2025-03-10 PROCEDURE — 99281 EMR DPT VST MAYX REQ PHY/QHP: CPT | Performed by: OTOLARYNGOLOGY

## 2025-03-10 PROCEDURE — 85730 THROMBOPLASTIN TIME PARTIAL: CPT

## 2025-03-10 PROCEDURE — 99283 EMERGENCY DEPT VISIT LOW MDM: CPT

## 2025-03-10 PROCEDURE — 85610 PROTHROMBIN TIME: CPT

## 2025-03-10 PROCEDURE — 36415 COLL VENOUS BLD VENIPUNCTURE: CPT

## 2025-03-10 PROCEDURE — 85025 COMPLETE CBC W/AUTO DIFF WBC: CPT

## 2025-03-10 RX ORDER — LIDOCAINE HYDROCHLORIDE AND EPINEPHRINE 10; 10 MG/ML; UG/ML
10 INJECTION, SOLUTION INFILTRATION; PERINEURAL ONCE
Status: COMPLETED | OUTPATIENT
Start: 2025-03-10 | End: 2025-03-10

## 2025-03-10 RX ORDER — TRANEXAMIC ACID 100 MG/ML
1000 INJECTION, SOLUTION INTRAVENOUS ONCE
Status: COMPLETED | OUTPATIENT
Start: 2025-03-10 | End: 2025-03-10

## 2025-03-10 RX ORDER — GINSENG 100 MG
1 CAPSULE ORAL ONCE
Status: COMPLETED | OUTPATIENT
Start: 2025-03-10 | End: 2025-03-10

## 2025-03-10 RX ORDER — OXYMETAZOLINE HYDROCHLORIDE 0.05 G/100ML
2 SPRAY NASAL ONCE
Status: COMPLETED | OUTPATIENT
Start: 2025-03-10 | End: 2025-03-10

## 2025-03-10 RX ADMIN — TRANEXAMIC ACID 1000 MG: 100 INJECTION INTRAVENOUS at 08:38

## 2025-03-10 RX ADMIN — BACITRACIN 1 SMALL APPLICATION: 500 OINTMENT TOPICAL at 10:06

## 2025-03-10 RX ADMIN — OXYMETAZOLINE HYDROCHLORIDE 2 SPRAY: 0.05 SPRAY NASAL at 08:37

## 2025-03-10 RX ADMIN — LIDOCAINE HYDROCHLORIDE,EPINEPHRINE BITARTRATE 10 ML: 10; .01 INJECTION, SOLUTION INFILTRATION; PERINEURAL at 09:39

## 2025-03-10 RX ADMIN — BACITRACIN 1 SMALL APPLICATION: 500 OINTMENT TOPICAL at 09:38

## 2025-03-10 NOTE — ED NOTES
Dr. Chacko into assess and pack pt's nose d/t increased bleeding from opposite nare     Margaret Chavez RN  03/10/25 [id number]

## 2025-03-10 NOTE — CONSULTS
Consultation - [facility]/ENT   Miguel Stephens 64 y.o. male MRN: [id number]  Unit/Bed#: QCB Encounter: [id number]        Assessment:  64-year-old man presenting with epistaxis, packed bilaterally by the ED.  Was not actively bleeding when seen, left-sided nasal packing was removed.  Patient remained hemostatic 20 minutes after packing was removed.    Plan:  -Follow-up on Wednesday for nasal packing removal  -Antistaph prophylaxis while nasal packing is in place  -Recommended patient check his blood pressure regularly at home to make sure it is well-controlled  -Patient cleared for discharge from ENT perspective    History of Present Illness   Physician Requesting Consult: No att. providers found  Reason for Consult / Principal Problem: Epistaxis    HPI: Miguel Stephens is a 64 y.o. year old male with PMH hypertension, who presents with epistaxis.  Last had an epistaxis episode requiring right-sided nasal packing after he was renovating his home bathroom and had a tile fly into his nose 1 month ago.  Was seen at urgent care and told he has a cut inside his right nose.  He now presents with rebleeding  reported to be from both sides.  No AC/AP.  No family history of bleeding disorders.  Packed bilaterally with 10 cm Merocels in the ED.  Hypertensive in the /103.  Hemoglobin 14.3.  INR 0.95    Review of systems:  10 Point ROS was performed and negative except as above or otherwise noted in the medical record.    Historical Information   Past Medical History:   Diagnosis Date    Colon polyps 11/2020    Melanoma (HCC)      Past Surgical History:   Procedure Laterality Date    SKIN SURGERY  2004    melanoma removal - [facility]     Social History   Social History     Substance and Sexual Activity   Alcohol Use Yes    Comment: 1-2 beers on occasion     Social History     Substance and Sexual Activity   Drug Use No     Social History     Tobacco Use   Smoking Status Never   Smokeless Tobacco Never     Family  History: Family history non-contributory    Meds/Allergies   all current active meds have been reviewed    No Known Allergies    Objective     Vitals:    03/10/25 0815   BP: (!) 166/103   Pulse: 80   Resp: 18   Temp: 97.7 °F (36.5 °C)   SpO2: 97%         Physical Exam   Constitutional: Oriented to person, place, and time. Well-developed and well-nourished, no apparent distress, non-toxic appearance. Cooperative, able to hear and answer questions without difficulty.    Voice: Normal voice quality.  Head: Normocephalic, atraumatic.  No scars, masses or lesions.  Face: Symmetric, no edema, no sinus tenderness.  Eyes: Vision grossly intact, extra-ocular movement intact.  Ears: External ears normal.  No post-auricular erythema or tenderness.  Nose: Septum intact, bilateral Merocels in place, not saturated.  Oral cavity: Dentition intact.  Mucosa moist, lips without lesions or masses.  Tongue mobile, floor of mouth soft and flat.  Hard palate intact.  No masses or lesions.   Oropharynx: Uvula is midline, soft palate intact without lesion or mass.  Oropharyngeal inlet without obstruction.  Tonsils unremarkable.  Posterior pharyngeal wall clear no bleeding or clot visualized. No masses or lesions.  Salivary glands:  Parotid glands and submandibular glands symmetric, no enlargement or tenderness.  Neck: Normal laryngeal elevation with swallow.  Trachea midline.  No masses or lesions. No palpable adenopathy.  Thyroid: Without tenderness or palpable nodules.  Pulmonary/Chest: Normal effort and rate. No respiratory distress. No stertor or stridor  Musculoskeletal: Normal range of motion.   Neurological: Cranial nerves 2-12 intact.  Skin: Skin is warm and dry.   Psychiatric: Normal mood and affect.    Procedure:  After informed verbal consent, the left-sided Merocel was saturated with Afrin and removed in 1 motion.  No further bleeding was appreciated from the left nare.  Old blood was suctioned out of the left nasal cavity.   Posterior pharyngeal wall was clear with no further bleeding.  Patient tolerated the procedure well.    No intake or output data in the 24 hours ending 03/10/25 1426    Invasive Devices       None                   Lab Results: CBC:   Lab Results   Component Value Date    WBC 5.50 03/10/2025    HGB 14.3 03/10/2025    HCT 42.7 03/10/2025    MCV 84 03/10/2025     03/10/2025    RBC 5.08 03/10/2025    MCH 28.1 03/10/2025    MCHC 33.5 03/10/2025    RDW 11.9 03/10/2025    MPV 9.6 03/10/2025    NRBC 0 03/10/2025   , CMP:   Lab Results   Component Value Date    SODIUM 138 03/10/2025    K 4.4 03/10/2025     03/10/2025    CO2 26 03/10/2025    BUN 18 03/10/2025    CREATININE 1.04 03/10/2025    CALCIUM 9.7 03/10/2025    AST 18 03/10/2025    ALT 17 03/10/2025    ALKPHOS 48 03/10/2025    EGFR 75 03/10/2025   , Coags:   Lab Results   Component Value Date    PTT 24 03/10/2025    INR 0.95 03/10/2025     Imaging Studies: Results Review Statement: No pertinent imaging studies reviewed.  EKG, Pathology, and Other Studies: Results Review Statement: No pertinent imaging studies reviewed.    Code Status: No Order  Advance Directive and Living Will:      Power of :    POLST:

## 2025-03-10 NOTE — ED PROVIDER NOTES
Time reflects when diagnosis was documented in both MDM as applicable and the Disposition within this note       Time User Action Codes Description Comment    3/10/2025 10:45 AM Jay Jay Chacko Add [R04.0] Epistaxis           ED Disposition       ED Disposition   Discharge    Condition   Stable    Date/Time   Mon Mar 10, 2025  1:26 PM    Comment   Miguel Stephens discharge to home/self care.                   Assessment & Plan       Medical Decision Making  Will attempt to manage medically with Afrin, TXA, direct nasal pressure    Patient ultimately required bilateral nasal packing for management of epistaxis.  The patient reported persistent bleeding down the back of his throat, which point ENT was consulted.  By time of ENT evaluation, sometime later, patient's bleeding had stopped.  Left nasal packing was removed, patient remained hemostatic.  Patient stable for discharge to follow-up with ENT outpatient    Amount and/or Complexity of Data Reviewed  Labs: ordered.    Risk  OTC drugs.  Prescription drug management.             Medications   oxymetazoline (AFRIN) 0.05 % nasal spray 2 spray (2 sprays Each Nare Given 3/10/25 0837)   tranexamic acid 100mg/mL (TOPICAL/EPISTAXIS) 1,000 mg (1,000 mg Nasal Given 3/10/25 0838)   bacitracin topical ointment 1 small application (1 small application Topical Given 3/10/25 0938)   lidocaine-epinephrine (XYLOCAINE/EPINEPHRINE) 1 %-1:100,000 injection 10 mL (10 mL Infiltration Given by Other 3/10/25 0939)   bacitracin topical ointment 1 small application (1 small application Topical Given by Other 3/10/25 1006)       ED Risk Strat Scores                                                History of Present Illness       Chief Complaint   Patient presents with    Nose Bleed     Pt started with nose bleed this AM. Pt seen here a few weeks ago for the same issue. -thinners       Past Medical History:   Diagnosis Date    Colon polyps 11/2020    Melanoma (HCC)       Past Surgical History:    Procedure Laterality Date    SKIN SURGERY  2004    melanoma removal - [facility]      Family History   Problem Relation Age of Onset    Breast cancer Mother     Cirrhosis Father       Social History     Tobacco Use    Smoking status: Never    Smokeless tobacco: Never   Vaping Use    Vaping status: Never Used   Substance Use Topics    Alcohol use: Yes     Comment: 1-2 beers on occasion    Drug use: No      E-Cigarette/Vaping    E-Cigarette Use Never User       E-Cigarette/Vaping Substances    Nicotine No     THC No     CBD No     Flavoring No     Other No     Unknown No       I have reviewed and agree with the history as documented.     Patient is a 64-year-old male with a past medical history of previous epistaxis requiring nasal packing presenting with the same.  Patient reports no falls or trauma, spontaneous epistaxis from both nares.  Patient reports a small amount of blood is gone down his throat.  Denies any history of bleeding disorders, not on any blood thinners.  No other symptoms on review of systems        Review of Systems   All other systems reviewed and are negative.          Objective       ED Triage Vitals [03/10/25 0815]   Temperature Pulse Blood Pressure Respirations SpO2 Patient Position - Orthostatic VS   97.7 °F (36.5 °C) 80 (!) 166/103 18 97 % Lying      Temp Source Heart Rate Source BP Location FiO2 (%) Pain Score    Temporal Monitor Right arm -- No Pain      Vitals      Date and Time Temp Pulse SpO2 Resp BP Pain Score FACES Pain Rating User   03/10/25 0815 97.7 °F (36.5 °C) 80 97 % 18 166/103 No Pain -- KV            Physical Exam  Vitals and nursing note reviewed.   Constitutional:       General: He is not in acute distress.     Appearance: He is not ill-appearing.   HENT:      Head: Normocephalic and atraumatic.      Comments: Dried blood in bilateral nares, not actively bleeding     Nose: Nose normal.      Mouth/Throat:      Mouth: Mucous membranes are moist.      Pharynx:  Oropharynx is clear.   Cardiovascular:      Rate and Rhythm: Normal rate and regular rhythm.   Pulmonary:      Effort: Pulmonary effort is normal.      Breath sounds: Normal breath sounds.   Abdominal:      General: Abdomen is flat. Bowel sounds are normal.      Palpations: Abdomen is soft.   Musculoskeletal:         General: Normal range of motion.      Cervical back: Normal range of motion.   Skin:     General: Skin is warm and dry.   Neurological:      General: No focal deficit present.      Mental Status: He is alert and oriented to person, place, and time.   Psychiatric:         Mood and Affect: Mood normal.         Results Reviewed       Procedure Component Value Units Date/Time    Comprehensive metabolic panel [[id number]]  (Abnormal) Collected: 03/10/25 1118    Lab Status: Final result Specimen: Blood from Arm, Right Updated: 03/10/25 1154     Sodium 138 mmol/L      Potassium 4.4 mmol/L      Chloride 103 mmol/L      CO2 26 mmol/L      ANION GAP 9 mmol/L      BUN 18 mg/dL      Creatinine 1.04 mg/dL      Glucose 117 mg/dL      Calcium 9.7 mg/dL      AST 18 U/L      ALT 17 U/L      Alkaline Phosphatase 48 U/L      Total Protein 7.5 g/dL      Albumin 4.5 g/dL      Total Bilirubin 1.01 mg/dL      eGFR 75 ml/min/1.73sq m     Narrative:      National Kidney Disease Foundation guidelines for Chronic Kidney Disease (CKD):     Stage 1 with normal or high GFR (GFR > 90 mL/min/1.73 square meters)    Stage 2 Mild CKD (GFR = 60-89 mL/min/1.73 square meters)    Stage 3A Moderate CKD (GFR = 45-59 mL/min/1.73 square meters)    Stage 3B Moderate CKD (GFR = 30-44 mL/min/1.73 square meters)    Stage 4 Severe CKD (GFR = 15-29 mL/min/1.73 square meters)    Stage 5 End Stage CKD (GFR <15 mL/min/1.73 square meters)  Note: GFR calculation is accurate only with a steady state creatinine    Protime-INR [[id number]]  (Normal) Collected: 03/10/25 1118    Lab Status: Final result Specimen: Blood from Arm, Right Updated: 03/10/25 1145      Protime 13.0 seconds      INR 0.95    Narrative:      INR Therapeutic Range    Indication                                             INR Range      Atrial Fibrillation                                               2.0-3.0  Hypercoagulable State                                    2.0.2.3  Left Ventricular Asist Device                            2.0-3.0  Mechanical Heart Valve                                  -    Aortic(with afib, MI, embolism, HF, LA enlargement,    and/or coagulopathy)                                     2.0-3.0 (2.5-3.5)     Mitral                                                             2.5-3.5  Prosthetic/Bioprosthetic Heart Valve               2.0-3.0  Venous thromboembolism (VTE: VT, PE        2.0-3.0    APTT [[id number]]  (Normal) Collected: 03/10/25 1118    Lab Status: Final result Specimen: Blood from Arm, Right Updated: 03/10/25 1144     PTT 24 seconds     CBC and differential [[id number]] Collected: 03/10/25 1118    Lab Status: Final result Specimen: Blood from Arm, Right Updated: 03/10/25 1128     WBC 5.50 Thousand/uL      RBC 5.08 Million/uL      Hemoglobin 14.3 g/dL      Hematocrit 42.7 %      MCV 84 fL      MCH 28.1 pg      MCHC 33.5 g/dL      RDW 11.9 %      MPV 9.6 fL      Platelets 218 Thousands/uL      nRBC 0 /100 WBCs      Segmented % 63 %      Immature Grans % 1 %      Lymphocytes % 27 %      Monocytes % 7 %      Eosinophils Relative 2 %      Basophils Relative 0 %      Absolute Neutrophils 3.49 Thousands/µL      Absolute Immature Grans 0.03 Thousand/uL      Absolute Lymphocytes 1.48 Thousands/µL      Absolute Monocytes 0.39 Thousand/µL      Eosinophils Absolute 0.09 Thousand/µL      Basophils Absolute 0.02 Thousands/µL             No orders to display       Procedures    ED Medication and Procedure Management   Prior to Admission Medications   Prescriptions Last Dose Informant Patient Reported? Taking?   cholecalciferol (VITAMIN D3) 1,000 units tablet  Self Yes No   Sig: Take  1,000 Units by mouth every other day    valsartan (DIOVAN) 80 mg tablet   No No   Sig: Take 1 tablet (80 mg total) by mouth daily      Facility-Administered Medications: None     Discharge Medication List as of 3/10/2025  1:32 PM        CONTINUE these medications which have NOT CHANGED    Details   cholecalciferol (VITAMIN D3) 1,000 units tablet Take 1,000 Units by mouth every other day , Historical Med      valsartan (DIOVAN) 80 mg tablet Take 1 tablet (80 mg total) by mouth daily, Starting Wed 10/16/2024, Normal           No discharge procedures on file.  ED SEPSIS DOCUMENTATION   Time reflects when diagnosis was documented in both MDM as applicable and the Disposition within this note       Time User Action Codes Description Comment    3/10/2025 10:45 AM Jay Jay Chacko Add [R04.0] Epistaxis                  Jay Jay Chacko MD  03/10/25 6099

## 2025-03-10 NOTE — ED ATTENDING ATTESTATION
3/10/2025  I, Evin Tse MD, saw and evaluated the patient. I have discussed the patient with the resident/non-physician practitioner and agree with the resident's/non-physician practitioner's findings, Plan of Care, and MDM as documented in the resident's/non-physician practitioner's note, except where noted. All available labs and Radiology studies were reviewed.  I was present for key portions of any procedure(s) performed by the resident/non-physician practitioner and I was immediately available to provide assistance.       At this point I agree with the current assessment done in the Emergency Department.  I have conducted an independent evaluation of this patient a history and physical is as follows:    64-year-old man presenting with epistaxis.  Seen for the same a few weeks ago.  The primary bleeding is from the left nare, however there has been a little bit of bleeding from the right nare as well.  Patient does feel there is some blood running down his throat.  Has not had any vomiting.  Not on anticoagulation.  On exam patient awake and alert.  There is active epistaxis from the left nare without discernible single foci.  Small amount of blood in the posterior oropharynx.  We initially attempted control with Afrin and atomized tranexamic acid, however the patient continued to have bleeding.  Left nare was packed with improvement, however there was still some additional bleeding and right nare was then packed.  After this there was additional improvement in bleeding but the patient still had a small amount of blood going down his throat.  Due to concern for possible posterior source we consulted ENT.  They came to evaluate the patient, and by that time the bleeding had stopped.  Patient will need to follow-up closely with ENT in the office and we will give him precautions to return if bleeding starts again.    ED Course         Critical Care Time  Procedures

## 2025-03-10 NOTE — DISCHARGE INSTRUCTIONS
You were evaluated in the emergency department for nosebleed.  Your nosebleed was controlled in the emergency department.  Please follow-up with ENT for your recurrent nosebleeds and return to the emergency department if you have nosebleed that does not resolve with 15 minutes of anterior pressure.

## 2025-03-10 NOTE — ED NOTES
Pt c/o ear fullness and not being able to hear correctly out of his ears and reports continued bleeding from both nares. Provider made aware. Still awaiting ENT for consult     Anisha Hobbs RN  03/10/25 6846

## 2025-06-11 ENCOUNTER — OFFICE VISIT (OUTPATIENT)
Age: 65
End: 2025-06-11
Payer: COMMERCIAL

## 2025-06-11 VITALS
HEIGHT: 70 IN | SYSTOLIC BLOOD PRESSURE: 128 MMHG | TEMPERATURE: 98.1 F | HEART RATE: 52 BPM | WEIGHT: 198 LBS | OXYGEN SATURATION: 98 % | BODY MASS INDEX: 28.35 KG/M2 | DIASTOLIC BLOOD PRESSURE: 80 MMHG

## 2025-06-11 DIAGNOSIS — Z00.00 ANNUAL PHYSICAL EXAM: ICD-10-CM

## 2025-06-11 DIAGNOSIS — Z86.0100 HISTORY OF COLON POLYPS: Primary | ICD-10-CM

## 2025-06-11 PROCEDURE — 99397 PER PM REEVAL EST PAT 65+ YR: CPT | Performed by: INTERNAL MEDICINE

## 2025-06-11 NOTE — PATIENT INSTRUCTIONS
"Patient Education     Routine physical for adults   The Basics   Written by the doctors and editors at [facility]   What is a physical? -- A physical is a routine visit, or \"check-up,\" with your doctor. You might also hear it called a \"wellness visit\" or \"preventive visit.\"  During each visit, the doctor will:   Ask about your physical and mental health   Ask about your habits, behaviors, and lifestyle   Do an exam   Give you vaccines if needed   Talk to you about any medicines you take   Give advice about your health   Answer your questions  Getting regular check-ups is an important part of taking care of your health. It can help your doctor find and treat any problems you have. But it's also important for preventing health problems.  A routine physical is different from a \"sick visit.\" A sick visit is when you see a doctor because of a health concern or problem. Since physicals are scheduled ahead of time, you can think about what you want to ask the doctor.  How often should I get a physical? -- It depends on your age and health. In general, for people age 21 years and older:   If you are younger than 50 years, you might be able to get a physical every 3 years.   If you are 50 years or older, your doctor might recommend a physical every year.  If you have an ongoing health condition, like diabetes or high blood pressure, your doctor will probably want to see you more often.  What happens during a physical? -- In general, each visit will include:   Physical exam - The doctor or nurse will check your height, weight, heart rate, and blood pressure. They will also look at your eyes and ears. They will ask about how you are feeling and whether you have any symptoms that bother you.   Medicines - It's a good idea to bring a list of all the medicines you take to each doctor visit. Your doctor will talk to you about your medicines and answer any questions. Tell them if you are having any side effects that bother you. You " "should also tell them if you are having trouble paying for any of your medicines.   Habits and behaviors - This includes:   Your diet   Your exercise habits   Whether you smoke, drink alcohol, or use drugs   Whether you are sexually active   Whether you feel safe at home  Your doctor will talk to you about things you can do to improve your health and lower your risk of health problems. They will also offer help and support. For example, if you want to quit smoking, they can give you advice and might prescribe medicines. If you want to improve your diet or get more physical activity, they can help you with this, too.   Lab tests, if needed - The tests you get will depend on your age and situation. For example, your doctor might want to check your:   Cholesterol   Blood sugar   Iron level   Vaccines - The recommended vaccines will depend on your age, health, and what vaccines you already had. Vaccines are very important because they can prevent certain serious or deadly infections.   Discussion of screening - \"Screening\" means checking for diseases or other health problems before they cause symptoms. Your doctor can recommend screening based on your age, risk, and preferences. This might include tests to check for:   Cancer, such as breast, prostate, cervical, ovarian, colorectal, prostate, lung, or skin cancer   Sexually transmitted infections, such as chlamydia and gonorrhea   Mental health conditions like depression and anxiety  Your doctor will talk to you about the different types of screening tests. They can help you decide which screenings to have. They can also explain what the results might mean.   Answering questions - The physical is a good time to ask the doctor or nurse questions about your health. If needed, they can refer you to other doctors or specialists, too.  Adults older than 65 years often need other care, too. As you get older, your doctor will talk to you about:   How to prevent falling at " home   Hearing or vision tests   Memory testing   How to take your medicines safely   Making sure that you have the help and support you need at home  All topics are updated as new evidence becomes available and our peer review process is complete.  This topic retrieved from [employer] on: May 02, 2024.  Topic 729977 Version 1.0  Release: 32.4.3 - C32.122  © 2024 UpToDate, Inc. and/or its affiliates. All rights reserved.  Consumer Information Use and Disclaimer   Disclaimer: This generalized information is a limited summary of diagnosis, treatment, and/or medication information. It is not meant to be comprehensive and should be used as a tool to help the user understand and/or assess potential diagnostic and treatment options. It does NOT include all information about conditions, treatments, medications, side effects, or risks that may apply to a specific patient. It is not intended to be medical advice or a substitute for the medical advice, diagnosis, or treatment of a health care provider based on the health care provider's examination and assessment of a patient's specific and unique circumstances. Patients must speak with a health care provider for complete information about their health, medical questions, and treatment options, including any risks or benefits regarding use of medications. This information does not endorse any treatments or medications as safe, effective, or approved for treating a specific patient. UpToDate, Inc. and its affiliates disclaim any warranty or liability relating to this information or the use thereof.The use of this information is governed by the Terms of Use, available at https://www.woltersQuaamuwer.com/en/know/clinical-effectiveness-terms. 2024© UpToDate, Inc. and its affiliates and/or licensors. All rights reserved.  Copyright   © 2024 UpToDate, Inc. and/or its affiliates. All rights reserved.

## 2025-06-11 NOTE — ASSESSMENT & PLAN NOTE
Patient is in an overall good state of health.  Lipid profile is less than optimal.  Patient was given some dietary guidelines for heart healthy diet and Mediterranean diets and hopefully he can incorporate that manage we will evaluate his lipid profile in 6 months  Orders:    CBC and differential; Future    Comprehensive metabolic panel; Future    Lipid panel; Future    PSA, Total Screen; Future    UA (URINE) with reflex to Scope; Future

## 2025-06-11 NOTE — ASSESSMENT & PLAN NOTE
Patient referred to gastroenterology for follow-up colonoscopy.    Orders:    Ambulatory Referral to Gastroenterology; Future

## 2025-06-11 NOTE — PROGRESS NOTES
Adult Annual Physical  Name: Miguel Stephens      : 1960      MRN: [id number]  Encounter Provider: Vasquez Haji MD  Encounter Date: 2025   Encounter department: [facility] [place]    :  Assessment & Plan  History of colon polyps  Patient referred to gastroenterology for follow-up colonoscopy.    Orders:    Ambulatory Referral to Gastroenterology; Future    Annual physical exam  Patient is in an overall good state of health.  Lipid profile is less than optimal.  Patient was given some dietary guidelines for heart healthy diet and Mediterranean diets and hopefully he can incorporate that manage we will evaluate his lipid profile in 6 months  Orders:    CBC and differential; Future    Comprehensive metabolic panel; Future    Lipid panel; Future    PSA, Total Screen; Future    UA (URINE) with reflex to Scope; Future        Preventive Screenings:  - Diabetes Screening: screening up-to-date  - Cholesterol Screening: screening up-to-date   - Hepatitis C screening: patient declines   - HIV screening: patient declines   - Colon cancer screening: screening up-to-date   - Lung cancer screening: screening not indicated   - Prostate cancer screening: screening up-to-date   - AAA screening: screening not indicated     Immunizations:  - Immunizations due: Prevnar 20 and Zoster (Shingrix)         History of Present Illness     Adult Annual Physical:  Patient presents for annual physical.     Diet and Physical Activity:  - Diet/Nutrition: well balanced diet, portion control, consuming 3-5 servings of fruits/vegetables daily, adequate fiber intake and adequate whole grain intake.  - Exercise: walking, moderate cardiovascular exercise, strength training exercises, 5-7 times a week on average and 30-60 minutes on average.    Depression Screening:  - PHQ-2 Score: 0    General Health:  - Sleep: sleeps well and 7-8 hours of sleep on average.  - Hearing: normal hearing bilateral ears.  -  "Vision: no vision problems. wears reading glasses 2.25  - Dental: regular dental visits, brushes teeth twice daily and floss regularly.     Health:  - History of STDs: no.   - Urinary symptoms: none.     Advanced Care Planning:  - Has an advanced directive?: no    - Has a durable medical POA?: no      Review of Systems   Constitutional: Negative.    HENT: Negative.     Eyes: Negative.    Respiratory: Negative.     Cardiovascular: Negative.    Gastrointestinal: Negative.    Endocrine: Negative.    Genitourinary: Negative.    Musculoskeletal:  Positive for arthralgias.   Skin: Negative.    Allergic/Immunologic: Negative.    Neurological: Negative.    Hematological: Negative.    Psychiatric/Behavioral: Negative.  Hallucinations: minor of hands and fingers.          Objective   /80 (BP Location: Left arm, Patient Position: Sitting, Cuff Size: Standard)   Pulse (!) 52   Temp 98.1 °F (36.7 °C) (Temporal)   Ht 5' 10\" (1.778 m)   Wt 89.8 kg (198 lb)   SpO2 98%   BMI 28.41 kg/m²     Physical Exam  Vitals reviewed.   Constitutional:       Appearance: He is well-developed and normal weight.   HENT:      Head: Normocephalic and atraumatic.      Right Ear: Tympanic membrane, ear canal and external ear normal. There is no impacted cerumen.      Left Ear: Tympanic membrane, ear canal and external ear normal. There is no impacted cerumen.      Nose: Nose normal.      Mouth/Throat:      Mouth: Mucous membranes are moist.      Pharynx: Oropharynx is clear.     Eyes:      General: No scleral icterus.     Conjunctiva/sclera: Conjunctivae normal.      Pupils: Pupils are equal, round, and reactive to light.     Neck:      Vascular: No carotid bruit.     Cardiovascular:      Rate and Rhythm: Normal rate and regular rhythm.      Pulses: Normal pulses.      Heart sounds: Normal heart sounds. No murmur heard.  Pulmonary:      Effort: Pulmonary effort is normal. No respiratory distress.      Breath sounds: Normal breath sounds. "   Abdominal:      General: Abdomen is flat. Bowel sounds are normal. There is no distension.      Palpations: Abdomen is soft. There is no mass.      Tenderness: There is no abdominal tenderness. There is no guarding or rebound.      Hernia: No hernia is present.      Comments: No hepatomegaly no splenomegaly.     Musculoskeletal:         General: No swelling, tenderness or deformity. Normal range of motion.      Cervical back: Normal range of motion and neck supple.      Right lower leg: No edema.      Left lower leg: No edema.     Skin:     General: Skin is warm and dry.      Capillary Refill: Capillary refill takes less than 2 seconds.      Coloration: Skin is not jaundiced.      Findings: No bruising, erythema or rash.     Neurological:      General: No focal deficit present.      Mental Status: He is alert and oriented to person, place, and time. Mental status is at baseline.     Psychiatric:         Mood and Affect: Mood normal.         Behavior: Behavior normal.         Thought Content: Thought content normal.         Judgment: Judgment normal.

## 2025-07-18 ENCOUNTER — TELEPHONE (OUTPATIENT)
Age: 65
End: 2025-07-18

## 2025-07-18 NOTE — TELEPHONE ENCOUNTER
Patient called states experiencing gout symptoms. Symptoms began two days ago patient experiencing left foot pain and sharp pain. No available appts. Advised patient to be seen at . Verbalized understanding and  agreeable to plan

## 2025-07-19 ENCOUNTER — OFFICE VISIT (OUTPATIENT)
Dept: URGENT CARE | Facility: CLINIC | Age: 65
End: 2025-07-19
Payer: COMMERCIAL

## 2025-07-19 VITALS
WEIGHT: 198 LBS | TEMPERATURE: 97.7 F | RESPIRATION RATE: 16 BRPM | HEART RATE: 54 BPM | DIASTOLIC BLOOD PRESSURE: 77 MMHG | SYSTOLIC BLOOD PRESSURE: 141 MMHG | HEIGHT: 70 IN | BODY MASS INDEX: 28.35 KG/M2 | OXYGEN SATURATION: 98 %

## 2025-07-19 DIAGNOSIS — M10.9 ACUTE GOUT INVOLVING TOE OF LEFT FOOT, UNSPECIFIED CAUSE: Primary | ICD-10-CM

## 2025-07-19 PROCEDURE — G0382 LEV 3 HOSP TYPE B ED VISIT: HCPCS | Performed by: PHYSICIAN ASSISTANT

## 2025-07-19 RX ORDER — PREDNISONE 10 MG/1
40 TABLET ORAL DAILY
Qty: 20 TABLET | Refills: 0 | Status: SHIPPED | OUTPATIENT
Start: 2025-07-19 | End: 2025-07-24

## 2025-07-19 NOTE — PROGRESS NOTES
"  [facility]        NAME: Miguel Stephens is a 65 y.o. male  : 1960    MRN: [id number]  DATE: 2025  TIME: 9:12 AM    Assessment and Plan   Acute gout involving toe of left foot, unspecified cause [M10.9]  1. Acute gout involving toe of left foot, unspecified cause  predniSONE 10 mg tablet        Suspect symptoms are caused by gout.  This would be the patient's first outbreak of gout.  Will treat with prednisone once a day for 5 days.  Recommending he follow-up with his PCP for uric acid levels.  Discussed that if this becomes a chronic problem he will need to be on allopurinol.  Discussed diet and staying hydrated.    Patient Instructions     Patient Instructions   Patient Education     Gout   The Basics   Written by the doctors and editors at [facility]   What is gout? -- Gout is a form of arthritis. It can cause pain and swelling in the joints.  Gout happens in people who have too much uric acid in their blood. Uric acid is a chemical that is produced when the body breaks down certain foods. Uric acid can form sharp needle-like crystals that build up in the joints and cause pain. These crystals can also form inside the tubes that carry urine from the kidneys to the bladder. There, they can turn into kidney stones that can cause pain and problems with the flow of urine.  People with gout get sudden \"flares\" or attacks of severe pain. But there are medicines that can help prevent this.  What are the symptoms of gout? -- Gout flares most often cause pain in the big toe, ankle, or knee. Often, the joint also turns red and swells. Usually, only 1 joint is affected, but some people have pain in more than 1 joint. Gout flares tend to happen more often during the night.  The pain from gout can be extreme. The pain and swelling are worst at the beginning of a flare. The symptoms then get better within a few days to weeks. It is not clear how the body \"turns off\" a gout flare.  Is there a test for " gout? -- Yes. To test for gout, your doctor or nurse can take a sample of fluid from your joint that is in pain. If they find typical gout crystals in the fluid, then you have gout.  Even without checking fluid from a joint, the doctor or nurse might still strongly suspect gout if:   You have had pain and swelling in 1 joint, especially the joint at the base of the big toe.   Your symptoms completely go away between flares, at least when you first start having them.   Your blood tests show high levels of uric acid.  How is gout treated? -- There are a few medicines that can reduce the pain and swelling caused by gout. When you find one that works for you, have it close by all of the time. That way, you can take it as soon you feel a flare starting. Gout medicines work best if you take them as soon as symptoms start.  The medicines used to treat gout flares include:   NSAIDs - This is a large group of medicines that includes ibuprofen (sample brand names: Advil, Motrin) and indomethacin (brand name: Indocin). NSAIDs might not be safe for people with kidney or liver disease, or for people who have bleeding problems.   Colchicine - This medicine helps with gout but it can also cause diarrhea, nausea, vomiting, and stomach pain.   Steroids - Steroid medicines can reduce swelling and pain. Steroids can be taken as pills or as shots.  Are there medicines to prevent gout flares? -- Yes, there are medicines that can reduce the chances of having future gout flares. Most people who have repeated or severe flares of gout need to take these medicines. In general, they all work by reducing the amount of uric acid in the blood.  Examples of these medicines include:   Allopurinol (brand names: Aloprim, Zyloprim)   Febuxostat (brand name: Uloric)   Probenecid  People with severe gout can also get a medicine called pegloticase (brand name: Krystexxa), which is given through a vein. This medicine can cause an allergic reaction in  "some people.  If you take a medicine to prevent gout, your doctor or nurse will want to make sure that you use it safely. They might also want to check that your uric acid level gets low enough to dissolve the gout crystals. Allopurinol, febuxostat, and probenecid can actually increase gout flares when you first start taking them. To prevent these flares, your doctor or nurse might suggest that you take low doses of colchicine when you start the medicines. This will give the gout crystals time to dissolve, and that will stop the flares over time. If you do have a gout flare, it's important to keep taking your daily medicines normally.  Your doctor will order blood tests to check your uric acid levels regularly. This is to make sure that the medicines are working and that you are taking the right dose. Keeping your uric acid level below a certain level can help prevent gout flares.  Can I do anything on my own to prevent gout flares? -- Yes. There are some things that might help:   It's not clear that following a specific diet plan will help with gout symptoms. But eating a balanced diet can help improve your overall health. Try to eat plenty of fruits, vegetables, whole grains, and low-fat dairy products. It's also important to drink plenty of water, and try not to get dehydrated.   Limit sugary drinks and alcohol. These can make gout flares worse.   Some people feel better when they limit foods that are high in \"purines.\" (Purines are natural substances found in many foods.) You can ask your doctor if avoiding high-purine foods might help you. But other things, like taking your medicines and avoiding alcohol, are more likely to help with gout symptoms.   If you have excess body weight, losing weight can help relieve gout.   Some people with gout also have other health problems, such as heart disease, high blood pressure, kidney disease, or obesity. If you have any of these issues, work with your doctor to manage " "them. This can help improve your overall health and might also help with your gout.   When you have a gout flare, you might feel better if you also rest or ice your joint.  If you are having trouble managing your gout, your doctor might refer you to a specialist called a \"rheumatologist.\"  All topics are updated as new evidence becomes available and our peer review process is complete.  This topic retrieved from [employer] on: Mar 30, 2024.  Topic 58899 Version 22.0  Release: 32.2.4 - C32.88  © 2024 UpToDate, Inc. and/or its affiliates. All rights reserved.  Consumer Information Use and Disclaimer   Disclaimer: This generalized information is a limited summary of diagnosis, treatment, and/or medication information. It is not meant to be comprehensive and should be used as a tool to help the user understand and/or assess potential diagnostic and treatment options. It does NOT include all information about conditions, treatments, medications, side effects, or risks that may apply to a specific patient. It is not intended to be medical advice or a substitute for the medical advice, diagnosis, or treatment of a health care provider based on the health care provider's examination and assessment of a patient's specific and unique circumstances. Patients must speak with a health care provider for complete information about their health, medical questions, and treatment options, including any risks or benefits regarding use of medications. This information does not endorse any treatments or medications as safe, effective, or approved for treating a specific patient. UpToDate, Inc. and its affiliates disclaim any warranty or liability relating to this information or the use thereof.The use of this information is governed by the Terms of Use, available at https://www.wolterskluwer.com/en/know/clinical-effectiveness-terms. 2024© UpToDate, Inc. and its affiliates and/or licensors. All rights reserved.  Copyright   © 2024 " UpToDate, Inc. and/or its affiliates. All rights reserved.        Follow up with PCP in 3-5 days.  Proceed to  ER if symptoms worsen.    Chief Complaint     Chief Complaint   Patient presents with    Foot Pain     Pt stated that his left foot was swollen for 2 days but now the swelling has gone down. He stated that it felt like pins and needles.          History of Present Illness       The patient is a 65-year-old male with past medical history significant for HTN presenting today for erythema, pain and swelling of the left foot.  Denies prior episodes.  Denies history of gout.  Admits that he will occasionally drink 1 beer.  Pain began 2 days ago.  Admits that he had a steak last night.  Denies any injury to the foot.  Denies any scrapes or bug bites that he knows of.  Reports that the pain and swelling is isolated to the left MCP joint.         Review of Systems   Review of Systems   Constitutional:  Negative for activity change, appetite change, chills, diaphoresis and fever.   HENT:  Negative for congestion, ear pain, rhinorrhea and sore throat.    Eyes:  Negative for pain and visual disturbance.   Respiratory:  Negative for cough, chest tightness and shortness of breath.    Cardiovascular:  Negative for chest pain and palpitations.   Gastrointestinal:  Negative for abdominal pain, diarrhea, nausea and vomiting.   Genitourinary:  Negative for dysuria and hematuria.   Musculoskeletal:  Positive for joint swelling. Negative for arthralgias, back pain and myalgias.   Skin:  Positive for color change. Negative for pallor and rash.   Neurological:  Negative for seizures, syncope and headaches.   All other systems reviewed and are negative.        Current Medications     Current Medications[1]    Current Allergies     Allergies as of 07/19/2025 - Reviewed 07/19/2025   Allergen Reaction Noted    Pollen extract Allergic Rhinitis 06/11/2025            The following portions of the patient's history were reviewed and  "updated as appropriate: allergies, current medications, past family history, past medical history, past social history, past surgical history and problem list.     Past Medical History[2]    Past Surgical History[3]    Family History[4]      Medications have been verified.        Objective   /77   Pulse (!) 54   Temp 97.7 °F (36.5 °C)   Resp 16   Ht 5' 10\" (1.778 m)   Wt 89.8 kg (198 lb)   SpO2 98%   BMI 28.41 kg/m²        Physical Exam     Physical Exam  Constitutional:       General: He is not in acute distress.     Appearance: Normal appearance. He is normal weight. He is not ill-appearing, toxic-appearing or diaphoretic.     Cardiovascular:      Rate and Rhythm: Regular rhythm. Bradycardia present.   Pulmonary:      Effort: Pulmonary effort is normal.      Breath sounds: Normal breath sounds.     Musculoskeletal:      Comments: Erythema swelling and pain to palpation of the L MCP joint      Neurological:      Mental Status: He is alert.                        [1]   Current Outpatient Medications:     predniSONE 10 mg tablet, Take 4 tablets (40 mg total) by mouth daily for 5 days, Disp: 20 tablet, Rfl: 0    cholecalciferol (VITAMIN D3) 1,000 units tablet, Take 1,000 Units by mouth daily, Disp: , Rfl:     valsartan (DIOVAN) 80 mg tablet, Take 1 tablet (80 mg total) by mouth daily, Disp: 100 tablet, Rfl: 3  [2]   Past Medical History:  Diagnosis Date    Colon polyps 11/2020    Melanoma (HCC)    [3]   Past Surgical History:  Procedure Laterality Date    SKIN SURGERY  2004    melanoma removal - [facility]   [4]   Family History  Problem Relation Name Age of Onset    Breast cancer Mother      Cirrhosis Father       "

## 2025-07-19 NOTE — PATIENT INSTRUCTIONS
"Patient Education     Gout   The Basics   Written by the doctors and editors at [facility]   What is gout? -- Gout is a form of arthritis. It can cause pain and swelling in the joints.  Gout happens in people who have too much uric acid in their blood. Uric acid is a chemical that is produced when the body breaks down certain foods. Uric acid can form sharp needle-like crystals that build up in the joints and cause pain. These crystals can also form inside the tubes that carry urine from the kidneys to the bladder. There, they can turn into kidney stones that can cause pain and problems with the flow of urine.  People with gout get sudden \"flares\" or attacks of severe pain. But there are medicines that can help prevent this.  What are the symptoms of gout? -- Gout flares most often cause pain in the big toe, ankle, or knee. Often, the joint also turns red and swells. Usually, only 1 joint is affected, but some people have pain in more than 1 joint. Gout flares tend to happen more often during the night.  The pain from gout can be extreme. The pain and swelling are worst at the beginning of a flare. The symptoms then get better within a few days to weeks. It is not clear how the body \"turns off\" a gout flare.  Is there a test for gout? -- Yes. To test for gout, your doctor or nurse can take a sample of fluid from your joint that is in pain. If they find typical gout crystals in the fluid, then you have gout.  Even without checking fluid from a joint, the doctor or nurse might still strongly suspect gout if:   You have had pain and swelling in 1 joint, especially the joint at the base of the big toe.   Your symptoms completely go away between flares, at least when you first start having them.   Your blood tests show high levels of uric acid.  How is gout treated? -- There are a few medicines that can reduce the pain and swelling caused by gout. When you find one that works for you, have it close by all of the time. That " way, you can take it as soon you feel a flare starting. Gout medicines work best if you take them as soon as symptoms start.  The medicines used to treat gout flares include:   NSAIDs - This is a large group of medicines that includes ibuprofen (sample brand names: Advil, Motrin) and indomethacin (brand name: Indocin). NSAIDs might not be safe for people with kidney or liver disease, or for people who have bleeding problems.   Colchicine - This medicine helps with gout but it can also cause diarrhea, nausea, vomiting, and stomach pain.   Steroids - Steroid medicines can reduce swelling and pain. Steroids can be taken as pills or as shots.  Are there medicines to prevent gout flares? -- Yes, there are medicines that can reduce the chances of having future gout flares. Most people who have repeated or severe flares of gout need to take these medicines. In general, they all work by reducing the amount of uric acid in the blood.  Examples of these medicines include:   Allopurinol (brand names: Aloprim, Zyloprim)   Febuxostat (brand name: Uloric)   Probenecid  People with severe gout can also get a medicine called pegloticase (brand name: Krystexxa), which is given through a vein. This medicine can cause an allergic reaction in some people.  If you take a medicine to prevent gout, your doctor or nurse will want to make sure that you use it safely. They might also want to check that your uric acid level gets low enough to dissolve the gout crystals. Allopurinol, febuxostat, and probenecid can actually increase gout flares when you first start taking them. To prevent these flares, your doctor or nurse might suggest that you take low doses of colchicine when you start the medicines. This will give the gout crystals time to dissolve, and that will stop the flares over time. If you do have a gout flare, it's important to keep taking your daily medicines normally.  Your doctor will order blood tests to check your uric acid  "levels regularly. This is to make sure that the medicines are working and that you are taking the right dose. Keeping your uric acid level below a certain level can help prevent gout flares.  Can I do anything on my own to prevent gout flares? -- Yes. There are some things that might help:   It's not clear that following a specific diet plan will help with gout symptoms. But eating a balanced diet can help improve your overall health. Try to eat plenty of fruits, vegetables, whole grains, and low-fat dairy products. It's also important to drink plenty of water, and try not to get dehydrated.   Limit sugary drinks and alcohol. These can make gout flares worse.   Some people feel better when they limit foods that are high in \"purines.\" (Purines are natural substances found in many foods.) You can ask your doctor if avoiding high-purine foods might help you. But other things, like taking your medicines and avoiding alcohol, are more likely to help with gout symptoms.   If you have excess body weight, losing weight can help relieve gout.   Some people with gout also have other health problems, such as heart disease, high blood pressure, kidney disease, or obesity. If you have any of these issues, work with your doctor to manage them. This can help improve your overall health and might also help with your gout.   When you have a gout flare, you might feel better if you also rest or ice your joint.  If you are having trouble managing your gout, your doctor might refer you to a specialist called a \"rheumatologist.\"  All topics are updated as new evidence becomes available and our peer review process is complete.  This topic retrieved from [employer] on: Mar 30, 2024.  Topic 21123 Version 22.0  Release: 32.2.4 - C32.88  © 2024 UpToDate, Inc. and/or its affiliates. All rights reserved.  Consumer Information Use and Disclaimer   Disclaimer: This generalized information is a limited summary of diagnosis, treatment, and/or " medication information. It is not meant to be comprehensive and should be used as a tool to help the user understand and/or assess potential diagnostic and treatment options. It does NOT include all information about conditions, treatments, medications, side effects, or risks that may apply to a specific patient. It is not intended to be medical advice or a substitute for the medical advice, diagnosis, or treatment of a health care provider based on the health care provider's examination and assessment of a patient's specific and unique circumstances. Patients must speak with a health care provider for complete information about their health, medical questions, and treatment options, including any risks or benefits regarding use of medications. This information does not endorse any treatments or medications as safe, effective, or approved for treating a specific patient. UpToDate, Inc. and its affiliates disclaim any warranty or liability relating to this information or the use thereof.The use of this information is governed by the Terms of Use, available at https://www.nPickertersMagic [employer] NetworkuwLikeability.com/en/know/clinical-effectiveness-terms. 2024© UpToDate, Inc. and its affiliates and/or licensors. All rights reserved.  Copyright   © 2024 UpToDate, Inc. and/or its affiliates. All rights reserved.